# Patient Record
Sex: MALE | Race: WHITE | Employment: FULL TIME | URBAN - METROPOLITAN AREA
[De-identification: names, ages, dates, MRNs, and addresses within clinical notes are randomized per-mention and may not be internally consistent; named-entity substitution may affect disease eponyms.]

---

## 2022-12-20 ENCOUNTER — HOSPITAL ENCOUNTER (INPATIENT)
Age: 57
LOS: 4 days | Discharge: HOME | DRG: 871 | End: 2022-12-24
Attending: EMERGENCY MEDICINE | Admitting: HOSPITALIST
Payer: COMMERCIAL

## 2022-12-20 ENCOUNTER — APPOINTMENT (OUTPATIENT)
Dept: CT IMAGING | Age: 57
DRG: 871 | End: 2022-12-20
Payer: COMMERCIAL

## 2022-12-20 ENCOUNTER — APPOINTMENT (OUTPATIENT)
Dept: GENERAL RADIOLOGY | Age: 57
DRG: 871 | End: 2022-12-20
Payer: COMMERCIAL

## 2022-12-20 DIAGNOSIS — E10.10 DIABETIC KETOACIDOSIS WITHOUT COMA ASSOCIATED WITH TYPE 1 DIABETES MELLITUS (HCC): ICD-10-CM

## 2022-12-20 DIAGNOSIS — E87.1 HYPONATREMIA: ICD-10-CM

## 2022-12-20 DIAGNOSIS — J18.9 MULTIFOCAL PNEUMONIA: Primary | ICD-10-CM

## 2022-12-20 DIAGNOSIS — A41.9 SEPTICEMIA (HCC): ICD-10-CM

## 2022-12-20 DIAGNOSIS — R91.1 LUNG NODULE: ICD-10-CM

## 2022-12-20 PROBLEM — Z72.89 ENGAGES IN VAPING: Status: ACTIVE | Noted: 2022-12-20

## 2022-12-20 PROBLEM — R63.4 EXCESSIVE WEIGHT LOSS: Status: ACTIVE | Noted: 2022-12-20

## 2022-12-20 PROBLEM — F10.21 ALCOHOL USE DISORDER, SEVERE, IN SUSTAINED REMISSION (HCC): Status: ACTIVE | Noted: 2022-12-20

## 2022-12-20 PROBLEM — E11.65 UNCONTROLLED TYPE 2 DIABETES MELLITUS WITH HYPERGLYCEMIA (HCC): Status: ACTIVE | Noted: 2022-12-20

## 2022-12-20 PROBLEM — K86.81 EXOCRINE PANCREATIC INSUFFICIENCY: Status: ACTIVE | Noted: 2022-12-20

## 2022-12-20 PROBLEM — Z72.0 TOBACCO ABUSE: Status: ACTIVE | Noted: 2022-12-20

## 2022-12-20 PROBLEM — E11.10 DIABETIC KETOACIDOSIS ASSOCIATED WITH TYPE 2 DIABETES MELLITUS (HCC): Status: ACTIVE | Noted: 2022-12-20

## 2022-12-20 PROBLEM — E87.20 NORMAL ANION GAP METABOLIC ACIDOSIS: Status: ACTIVE | Noted: 2022-12-20

## 2022-12-20 PROBLEM — D72.829 LEUKOCYTOSIS: Status: ACTIVE | Noted: 2022-12-20

## 2022-12-20 PROBLEM — E87.29 INCREASED ANION GAP METABOLIC ACIDOSIS: Status: ACTIVE | Noted: 2022-12-20

## 2022-12-20 PROBLEM — E11.10 DKA, TYPE 2, NOT AT GOAL (HCC): Status: ACTIVE | Noted: 2022-12-20

## 2022-12-20 LAB
A/G RATIO: 1.1 (ref 1.1–2.2)
ALBUMIN SERPL-MCNC: 3.6 G/DL (ref 3.4–5)
ALP BLD-CCNC: 108 U/L (ref 40–129)
ALT SERPL-CCNC: 14 U/L (ref 10–40)
ANION GAP SERPL CALCULATED.3IONS-SCNC: 15 MMOL/L (ref 3–16)
ANION GAP SERPL CALCULATED.3IONS-SCNC: 19 MMOL/L (ref 3–16)
AST SERPL-CCNC: 21 U/L (ref 15–37)
BASE EXCESS VENOUS: -10.1 MMOL/L (ref -3–3)
BASE EXCESS VENOUS: -9.5 MMOL/L (ref -3–3)
BASOPHILS ABSOLUTE: 0 K/UL (ref 0–0.2)
BASOPHILS RELATIVE PERCENT: 0 %
BILIRUB SERPL-MCNC: 0.4 MG/DL (ref 0–1)
BILIRUBIN URINE: ABNORMAL
BLOOD, URINE: ABNORMAL
BUN BLDV-MCNC: 4 MG/DL (ref 7–20)
BUN BLDV-MCNC: 5 MG/DL (ref 7–20)
CALCIUM SERPL-MCNC: 8.1 MG/DL (ref 8.3–10.6)
CALCIUM SERPL-MCNC: 8.8 MG/DL (ref 8.3–10.6)
CARBOXYHEMOGLOBIN: 1.4 % (ref 0–1.5)
CARBOXYHEMOGLOBIN: 1.4 % (ref 0–1.5)
CHLORIDE BLD-SCNC: 84 MMOL/L (ref 99–110)
CHLORIDE BLD-SCNC: 89 MMOL/L (ref 99–110)
CHLORIDE URINE RANDOM: <20 MMOL/L
CLARITY: CLEAR
CO2: 15 MMOL/L (ref 21–32)
CO2: 16 MMOL/L (ref 21–32)
COLOR: YELLOW
CREAT SERPL-MCNC: 0.7 MG/DL (ref 0.9–1.3)
CREAT SERPL-MCNC: 0.8 MG/DL (ref 0.9–1.3)
CREATININE URINE: 45.5 MG/DL (ref 39–259)
EKG ATRIAL RATE: 93 BPM
EKG DIAGNOSIS: NORMAL
EKG P AXIS: 42 DEGREES
EKG P-R INTERVAL: 148 MS
EKG Q-T INTERVAL: 370 MS
EKG QRS DURATION: 102 MS
EKG QTC CALCULATION (BAZETT): 460 MS
EKG R AXIS: 68 DEGREES
EKG T AXIS: 65 DEGREES
EKG VENTRICULAR RATE: 93 BPM
EOSINOPHILS ABSOLUTE: 0 K/UL (ref 0–0.6)
EOSINOPHILS RELATIVE PERCENT: 0 %
EPITHELIAL CELLS, UA: ABNORMAL /HPF (ref 0–5)
ETHANOL: NORMAL MG/DL (ref 0–0.08)
ETHANOL: NORMAL MG/DL (ref 0–0.08)
GFR SERPL CREATININE-BSD FRML MDRD: >60 ML/MIN/{1.73_M2}
GFR SERPL CREATININE-BSD FRML MDRD: >60 ML/MIN/{1.73_M2}
GLUCOSE BLD-MCNC: 161 MG/DL (ref 70–99)
GLUCOSE BLD-MCNC: 163 MG/DL (ref 70–99)
GLUCOSE BLD-MCNC: 166 MG/DL (ref 70–99)
GLUCOSE BLD-MCNC: 176 MG/DL (ref 70–99)
GLUCOSE BLD-MCNC: 205 MG/DL (ref 70–99)
GLUCOSE URINE: NEGATIVE MG/DL
HCO3 VENOUS: 15.6 MMOL/L (ref 23–29)
HCO3 VENOUS: 15.7 MMOL/L (ref 23–29)
HCT VFR BLD CALC: 33.3 % (ref 40.5–52.5)
HEMOGLOBIN: 11.7 G/DL (ref 13.5–17.5)
HYALINE CASTS: ABNORMAL /LPF (ref 0–2)
INFLUENZA A: NOT DETECTED
INFLUENZA B: NOT DETECTED
KETONES, URINE: >=80 MG/DL
LACTIC ACID, SEPSIS: 1.1 MMOL/L (ref 0.4–1.9)
LACTIC ACID, SEPSIS: 1.4 MMOL/L (ref 0.4–1.9)
LEUKOCYTE ESTERASE, URINE: NEGATIVE
LYMPHOCYTES ABSOLUTE: 2.7 K/UL (ref 1–5.1)
LYMPHOCYTES RELATIVE PERCENT: 13 %
MCH RBC QN AUTO: 32.7 PG (ref 26–34)
MCHC RBC AUTO-ENTMCNC: 35.1 G/DL (ref 31–36)
MCV RBC AUTO: 93.2 FL (ref 80–100)
METHEMOGLOBIN VENOUS: 0.4 %
METHEMOGLOBIN VENOUS: 0.7 %
MICROSCOPIC EXAMINATION: YES
MONOCYTES ABSOLUTE: 1.5 K/UL (ref 0–1.3)
MONOCYTES RELATIVE PERCENT: 7 %
MUCUS: ABNORMAL /LPF
NEUTROPHILS ABSOLUTE: 16.7 K/UL (ref 1.7–7.7)
NEUTROPHILS RELATIVE PERCENT: 80 %
NITRITE, URINE: NEGATIVE
O2 SAT, VEN: 85 %
O2 SAT, VEN: 95 %
O2 THERAPY: ABNORMAL
O2 THERAPY: ABNORMAL
OSMOLALITY URINE: 197 MOSM/KG (ref 390–1070)
OSMOLALITY: 256 MOSM/KG (ref 275–295)
OSMOLALITY: 259 MOSM/KG (ref 275–295)
PCO2, VEN: 32 MMHG (ref 40–50)
PCO2, VEN: 34.2 MMHG (ref 40–50)
PDW BLD-RTO: 12.2 % (ref 12.4–15.4)
PERFORMED ON: ABNORMAL
PH UA: 5.5 (ref 5–8)
PH VENOUS: 7.28 (ref 7.35–7.45)
PH VENOUS: 7.31 (ref 7.35–7.45)
PLATELET # BLD: 410 K/UL (ref 135–450)
PMV BLD AUTO: 6.7 FL (ref 5–10.5)
PO2, VEN: 52.9 MMHG (ref 25–40)
PO2, VEN: 79.6 MMHG (ref 25–40)
POTASSIUM REFLEX MAGNESIUM: 3.9 MMOL/L (ref 3.5–5.1)
POTASSIUM REFLEX MAGNESIUM: 3.9 MMOL/L (ref 3.5–5.1)
POTASSIUM, UR: 15.5 MMOL/L
PROCALCITONIN: 0.68 NG/ML (ref 0–0.15)
PROTEIN UA: NEGATIVE MG/DL
RBC # BLD: 3.58 M/UL (ref 4.2–5.9)
RBC UA: ABNORMAL /HPF (ref 0–4)
SALICYLATE, SERUM: 2.7 MG/DL (ref 15–30)
SARS-COV-2 RNA, RT PCR: NOT DETECTED
SODIUM BLD-SCNC: 118 MMOL/L (ref 136–145)
SODIUM BLD-SCNC: 120 MMOL/L (ref 136–145)
SODIUM URINE: <20 MMOL/L
SPECIFIC GRAVITY UA: 1.01 (ref 1–1.03)
TCO2 CALC VENOUS: 17 MMOL/L
TCO2 CALC VENOUS: 17 MMOL/L
TOTAL PROTEIN: 7 G/DL (ref 6.4–8.2)
URINE REFLEX TO CULTURE: ABNORMAL
URINE TYPE: ABNORMAL
UROBILINOGEN, URINE: 0.2 E.U./DL
WBC # BLD: 20.9 K/UL (ref 4–11)
WBC UA: ABNORMAL /HPF (ref 0–5)

## 2022-12-20 PROCEDURE — 71250 CT THORAX DX C-: CPT

## 2022-12-20 PROCEDURE — 93005 ELECTROCARDIOGRAM TRACING: CPT | Performed by: EMERGENCY MEDICINE

## 2022-12-20 PROCEDURE — 71046 X-RAY EXAM CHEST 2 VIEWS: CPT

## 2022-12-20 PROCEDURE — 80179 DRUG ASSAY SALICYLATE: CPT

## 2022-12-20 PROCEDURE — 85025 COMPLETE CBC W/AUTO DIFF WBC: CPT

## 2022-12-20 PROCEDURE — 6370000000 HC RX 637 (ALT 250 FOR IP): Performed by: INTERNAL MEDICINE

## 2022-12-20 PROCEDURE — 87449 NOS EACH ORGANISM AG IA: CPT

## 2022-12-20 PROCEDURE — 87040 BLOOD CULTURE FOR BACTERIA: CPT

## 2022-12-20 PROCEDURE — 82570 ASSAY OF URINE CREATININE: CPT

## 2022-12-20 PROCEDURE — 82077 ASSAY SPEC XCP UR&BREATH IA: CPT

## 2022-12-20 PROCEDURE — 84145 PROCALCITONIN (PCT): CPT

## 2022-12-20 PROCEDURE — 6360000002 HC RX W HCPCS: Performed by: INTERNAL MEDICINE

## 2022-12-20 PROCEDURE — C9113 INJ PANTOPRAZOLE SODIUM, VIA: HCPCS | Performed by: INTERNAL MEDICINE

## 2022-12-20 PROCEDURE — 93010 ELECTROCARDIOGRAM REPORT: CPT | Performed by: INTERNAL MEDICINE

## 2022-12-20 PROCEDURE — 84300 ASSAY OF URINE SODIUM: CPT

## 2022-12-20 PROCEDURE — 2580000003 HC RX 258: Performed by: INTERNAL MEDICINE

## 2022-12-20 PROCEDURE — 99285 EMERGENCY DEPT VISIT HI MDM: CPT

## 2022-12-20 PROCEDURE — 87636 SARSCOV2 & INF A&B AMP PRB: CPT

## 2022-12-20 PROCEDURE — 99223 1ST HOSP IP/OBS HIGH 75: CPT | Performed by: INTERNAL MEDICINE

## 2022-12-20 PROCEDURE — 2000000000 HC ICU R&B

## 2022-12-20 PROCEDURE — 83605 ASSAY OF LACTIC ACID: CPT

## 2022-12-20 PROCEDURE — 94640 AIRWAY INHALATION TREATMENT: CPT

## 2022-12-20 PROCEDURE — 2500000003 HC RX 250 WO HCPCS: Performed by: INTERNAL MEDICINE

## 2022-12-20 PROCEDURE — 82436 ASSAY OF URINE CHLORIDE: CPT

## 2022-12-20 PROCEDURE — 83930 ASSAY OF BLOOD OSMOLALITY: CPT

## 2022-12-20 PROCEDURE — 36415 COLL VENOUS BLD VENIPUNCTURE: CPT

## 2022-12-20 PROCEDURE — 82803 BLOOD GASES ANY COMBINATION: CPT

## 2022-12-20 PROCEDURE — 2580000003 HC RX 258: Performed by: EMERGENCY MEDICINE

## 2022-12-20 PROCEDURE — 84133 ASSAY OF URINE POTASSIUM: CPT

## 2022-12-20 PROCEDURE — 96375 TX/PRO/DX INJ NEW DRUG ADDON: CPT

## 2022-12-20 PROCEDURE — 80053 COMPREHEN METABOLIC PANEL: CPT

## 2022-12-20 PROCEDURE — 83935 ASSAY OF URINE OSMOLALITY: CPT

## 2022-12-20 PROCEDURE — 81001 URINALYSIS AUTO W/SCOPE: CPT

## 2022-12-20 PROCEDURE — 96365 THER/PROPH/DIAG IV INF INIT: CPT

## 2022-12-20 PROCEDURE — 6360000002 HC RX W HCPCS: Performed by: EMERGENCY MEDICINE

## 2022-12-20 RX ORDER — IPRATROPIUM BROMIDE AND ALBUTEROL SULFATE 2.5; .5 MG/3ML; MG/3ML
1 SOLUTION RESPIRATORY (INHALATION)
Status: DISCONTINUED | OUTPATIENT
Start: 2022-12-20 | End: 2022-12-20

## 2022-12-20 RX ORDER — PANTOPRAZOLE SODIUM 40 MG/10ML
40 INJECTION, POWDER, LYOPHILIZED, FOR SOLUTION INTRAVENOUS DAILY
Status: DISCONTINUED | OUTPATIENT
Start: 2022-12-20 | End: 2022-12-22

## 2022-12-20 RX ORDER — BENZONATATE 100 MG/1
200 CAPSULE ORAL 3 TIMES DAILY PRN
Status: DISCONTINUED | OUTPATIENT
Start: 2022-12-20 | End: 2022-12-20 | Stop reason: SDUPTHER

## 2022-12-20 RX ORDER — TRAZODONE HYDROCHLORIDE 100 MG/1
100 TABLET ORAL NIGHTLY
COMMUNITY
Start: 2022-10-21

## 2022-12-20 RX ORDER — LISINOPRIL 5 MG/1
5 TABLET ORAL DAILY
COMMUNITY
Start: 2022-10-21

## 2022-12-20 RX ORDER — KETOROLAC TROMETHAMINE 30 MG/ML
30 INJECTION, SOLUTION INTRAMUSCULAR; INTRAVENOUS ONCE
Status: COMPLETED | OUTPATIENT
Start: 2022-12-20 | End: 2022-12-20

## 2022-12-20 RX ORDER — 0.9 % SODIUM CHLORIDE 0.9 %
1000 INTRAVENOUS SOLUTION INTRAVENOUS ONCE
Status: COMPLETED | OUTPATIENT
Start: 2022-12-20 | End: 2022-12-20

## 2022-12-20 RX ORDER — INSULIN LISPRO 100 [IU]/ML
INJECTION, SOLUTION INTRAVENOUS; SUBCUTANEOUS
COMMUNITY
Start: 2021-09-14

## 2022-12-20 RX ORDER — INSULIN LISPRO 100 [IU]/ML
0-4 INJECTION, SOLUTION INTRAVENOUS; SUBCUTANEOUS NIGHTLY
Status: DISCONTINUED | OUTPATIENT
Start: 2022-12-20 | End: 2022-12-22

## 2022-12-20 RX ORDER — INSULIN LISPRO 100 [IU]/ML
0-8 INJECTION, SOLUTION INTRAVENOUS; SUBCUTANEOUS
Status: DISCONTINUED | OUTPATIENT
Start: 2022-12-20 | End: 2022-12-22

## 2022-12-20 RX ORDER — M-VIT,TX,IRON,MINS/CALC/FOLIC 27MG-0.4MG
1 TABLET ORAL DAILY
COMMUNITY

## 2022-12-20 RX ORDER — NICOTINE 21 MG/24HR
1 PATCH, TRANSDERMAL 24 HOURS TRANSDERMAL DAILY
Status: DISCONTINUED | OUTPATIENT
Start: 2022-12-21 | End: 2022-12-24 | Stop reason: HOSPADM

## 2022-12-20 RX ORDER — BENZONATATE 100 MG/1
100 CAPSULE ORAL 3 TIMES DAILY PRN
Status: DISCONTINUED | OUTPATIENT
Start: 2022-12-20 | End: 2022-12-24 | Stop reason: HOSPADM

## 2022-12-20 RX ORDER — INSULIN DEGLUDEC INJECTION 100 U/ML
INJECTION, SOLUTION SUBCUTANEOUS
COMMUNITY
Start: 2021-09-14

## 2022-12-20 RX ORDER — CITALOPRAM 40 MG/1
40 TABLET ORAL DAILY
COMMUNITY
Start: 2022-10-21

## 2022-12-20 RX ORDER — PANCRELIPASE 36000; 180000; 114000 [USP'U]/1; [USP'U]/1; [USP'U]/1
72000 CAPSULE, DELAYED RELEASE PELLETS ORAL
Status: ON HOLD | COMMUNITY
Start: 2021-01-13 | End: 2022-12-22 | Stop reason: CLARIF

## 2022-12-20 RX ORDER — IPRATROPIUM BROMIDE AND ALBUTEROL SULFATE 2.5; .5 MG/3ML; MG/3ML
1 SOLUTION RESPIRATORY (INHALATION) EVERY 4 HOURS PRN
Status: DISCONTINUED | OUTPATIENT
Start: 2022-12-20 | End: 2022-12-24 | Stop reason: HOSPADM

## 2022-12-20 RX ORDER — LOVASTATIN 20 MG/1
20 TABLET ORAL NIGHTLY
COMMUNITY
Start: 2022-10-21

## 2022-12-20 RX ORDER — SODIUM CHLORIDE, SODIUM GLUCONATE, SODIUM ACETATE, POTASSIUM CHLORIDE AND MAGNESIUM CHLORIDE 526; 502; 368; 37; 30 MG/100ML; MG/100ML; MG/100ML; MG/100ML; MG/100ML
INJECTION, SOLUTION INTRAVENOUS CONTINUOUS
Status: DISCONTINUED | OUTPATIENT
Start: 2022-12-20 | End: 2022-12-22

## 2022-12-20 RX ORDER — ACETAMINOPHEN 325 MG/1
650 TABLET ORAL EVERY 4 HOURS PRN
Status: DISCONTINUED | OUTPATIENT
Start: 2022-12-20 | End: 2022-12-24 | Stop reason: HOSPADM

## 2022-12-20 RX ORDER — ENOXAPARIN SODIUM 100 MG/ML
40 INJECTION SUBCUTANEOUS DAILY
Status: DISCONTINUED | OUTPATIENT
Start: 2022-12-20 | End: 2022-12-24 | Stop reason: HOSPADM

## 2022-12-20 RX ADMIN — SODIUM CHLORIDE, SODIUM GLUCONATE, SODIUM ACETATE, POTASSIUM CHLORIDE AND MAGNESIUM CHLORIDE: 526; 502; 368; 37; 30 INJECTION, SOLUTION INTRAVENOUS at 18:16

## 2022-12-20 RX ADMIN — CEFTRIAXONE SODIUM 1000 MG: 1 INJECTION, POWDER, FOR SOLUTION INTRAMUSCULAR; INTRAVENOUS at 15:24

## 2022-12-20 RX ADMIN — ACETAMINOPHEN 650 MG: 325 TABLET ORAL at 21:17

## 2022-12-20 RX ADMIN — ENOXAPARIN SODIUM 40 MG: 100 INJECTION SUBCUTANEOUS at 18:19

## 2022-12-20 RX ADMIN — SODIUM BICARBONATE 50 MEQ: 84 INJECTION, SOLUTION INTRAVENOUS at 18:19

## 2022-12-20 RX ADMIN — PANTOPRAZOLE SODIUM 40 MG: 40 INJECTION, POWDER, FOR SOLUTION INTRAVENOUS at 18:19

## 2022-12-20 RX ADMIN — AZITHROMYCIN MONOHYDRATE 500 MG: 500 INJECTION, POWDER, LYOPHILIZED, FOR SOLUTION INTRAVENOUS at 16:20

## 2022-12-20 RX ADMIN — KETOROLAC TROMETHAMINE 30 MG: 30 INJECTION, SOLUTION INTRAMUSCULAR; INTRAVENOUS at 15:21

## 2022-12-20 RX ADMIN — IPRATROPIUM BROMIDE AND ALBUTEROL SULFATE 1 AMPULE: .5; 2.5 SOLUTION RESPIRATORY (INHALATION) at 20:52

## 2022-12-20 RX ADMIN — BENZONATATE 100 MG: 100 CAPSULE ORAL at 21:17

## 2022-12-20 RX ADMIN — SODIUM CHLORIDE 1000 ML: 9 INJECTION, SOLUTION INTRAVENOUS at 14:21

## 2022-12-20 RX ADMIN — SODIUM CHLORIDE 1000 ML: 9 INJECTION, SOLUTION INTRAVENOUS at 13:28

## 2022-12-20 ASSESSMENT — PAIN DESCRIPTION - LOCATION
LOCATION: HEAD
LOCATION: HEAD

## 2022-12-20 ASSESSMENT — ENCOUNTER SYMPTOMS
SHORTNESS OF BREATH: 1
ABDOMINAL PAIN: 0
VOMITING: 0
NAUSEA: 0
COUGH: 1

## 2022-12-20 ASSESSMENT — PAIN SCALES - GENERAL
PAINLEVEL_OUTOF10: 2
PAINLEVEL_OUTOF10: 0
PAINLEVEL_OUTOF10: 6

## 2022-12-20 ASSESSMENT — PAIN DESCRIPTION - DESCRIPTORS: DESCRIPTORS: ACHING;PRESSURE

## 2022-12-20 ASSESSMENT — PAIN DESCRIPTION - ONSET: ONSET: ON-GOING

## 2022-12-20 ASSESSMENT — PAIN DESCRIPTION - FREQUENCY: FREQUENCY: CONTINUOUS

## 2022-12-20 ASSESSMENT — PAIN - FUNCTIONAL ASSESSMENT: PAIN_FUNCTIONAL_ASSESSMENT: 0-10

## 2022-12-20 ASSESSMENT — PAIN DESCRIPTION - PAIN TYPE: TYPE: ACUTE PAIN

## 2022-12-20 NOTE — H&P
HOSPITALISTS HISTORY AND PHYSICAL    12/20/2022 4:03 PM    Patient Information:  Chad Sanabria is a 61 y.o. male [id number]  PCP:  Joe Brennan (Tel: [phone] )    Chief complaint:    Chief Complaint   Patient presents with    Abnormal Lab     Pt reports that he has been ill, worse over last week with runny nose and fever and cough. Pt reports he was at his PCP office and had routine bloodwork done and found to have low sodium. History of Present Illness:  Osmar Minor is a 61 y.o. male who presented to the ED directly from his PCPs office after routine blood work revealed acute on chronic severe hyponatremia (118) and left shifted leukocytosis. Upon further questioning patient reports that he has been ill for greater than 1 weeks duration with fever, chills, runny nose and cough. He is unaware of any recent sick exposures, but is being evaluated during an influenza A outbreak. Patient has a complex medical history which includes pancreatic endocrine and exocrine insufficiency related to past EtOH abuse (currently in sustained remission x15 years). As a result, he is an insulin-dependent diabetic he reports that he was compliant with his regimen in most circumstances. Patient does continue to smoke tobacco.    Upon arrival to the ED patient was tachycardic, tachypneic, and with soft /59. Stat EKG demonstrates NSR without evidence of ischemia. CXR notable for right-sided multifocal pneumonia, as well as JACE pulmonary nodule. Influenza A/B and SARS-CoV-2 testing performed and noted to be negative. Labs obtained and notable for increased anion gap metabolic acidosis, acute hyperglycemia, large ketonuria consistent with DKA. Other lab abnormalities include: Acute on chronic hyponatremia 118, left shifted leukocytosis 20.9, and anemia with H/H 11.7/33. 3.   Patient initiated on 2 L IV fluid bolus per ED attending prior to admit request.  He also received a dosage of IV Rocephin and Zithromax following collection of blood cultures. Ultimately patient will require ICU admission to assist with DKA, severe hyponatremia, and multifocal pneumonia. History obtained from patient and review of [facility] chart     REVIEW OF SYSTEMS:   Constitutional: Positive for fever,chills, and generalized weakness  ENT: Positive for headache, rhinorrhea, and sore throat. Respiratory: Acute dyspnea with intermittent productive cough  Cardiovascular: Negative for chest pain, palpitations, peripheral edema, orthopnea or PND  Gastrointestinal: Chronic pancreatic exocrine insufficiency; frequent diarrhea; no hematemesis, hematochezia, or melena; positive anorexia  Genitourinary: Positive oliguria; negative for dysuria, frequency, retention; no incontinence  Hematologic/Lymphatic: Negative for bleeding tendency/excessive bruising  Musculoskeletal: Positive acute myalgias and arthalgias; able to ambulate without difficulty  Neurologic: Negative for LOC, seizure activity, paresthesias, dysarthria, vertigo, and gait disturbance  Skin: Negative for itching,rash, decubitus  Psychiatric: Negative for depression,anxiety, and agitation; no hallucinations; denies SI/HI  Endocrine: Insulin-dependent diabetes on Tresiba    Past Medical History:   has a past medical history of Diabetes mellitus ([facility] [address] Past Surgical History:   has no past surgical history on file. Medications:  No current facility-administered medications on file prior to encounter.      Current Outpatient Medications on File Prior to Encounter   Medication Sig Dispense Refill    citalopram (CELEXA) 40 MG tablet Take 40 mg by mouth daily      Insulin Degludec (TRESIBA FLEXTOUCH) 100 UNIT/ML SOPN Take 12 units daily      insulin lispro, 1 Unit Dial, (HUMALOG/ADMELOG) 100 UNIT/ML SOPN 4 units with breakfast, lunch and dinner plus scale for maXIMUM OF 30      lisinopril (PRINIVIL;ZESTRIL) 5 MG tablet Take 5 mg by mouth daily      lovastatin (MEVACOR) 20 MG tablet Take 20 mg by mouth nightly      metFORMIN (GLUCOPHAGE) 1000 MG tablet Take 1,000 mg by mouth 2 times daily (with meals)      lipase-protease-amylase (CREON) 49062-423927 units CPEP delayed release capsule Take 72,000 Units by mouth 3 times daily (with meals)      traZODone (DESYREL) 100 MG tablet Take 100 mg by mouth nightly      Multiple Vitamins-Minerals (THERAPEUTIC MULTIVITAMIN-MINERALS) tablet Take 1 tablet by mouth daily         Allergies:  No Known Allergies     Social History:   reports that he has been smoking cigarettes. He has never used smokeless tobacco. He reports that he does not currently use alcohol. He reports that he does not use drugs. Remote history of alcoholism in remission x15 years.     Family History:    Cancer Father   THROAT    Colon polyps Mother     Physical Exam:  /72   Pulse 96   Temp 98.4 °F (36.9 °C) (Oral)   Resp (!) 35   Ht 6' (1.829 m)   Wt 190 lb (86.2 kg)   SpO2 97%   BMI 25.77 kg/m²     General appearance: Acutely ill adult male resting in bed  Eyes: Sclera clear without conjunctival injection; PERRLA; EOMI  ENT: Mucous membranes dry without thrush; normal dentition  Neck: Supple without meningismus; no goiter; no carotid bruit bilaterally  Cardiovascular: Regular rhythm without ectopy; normal S1-S2 with no murmurs; no peripheral edema; no JVD  Respiratory: Mild tachypnea; diffuse wheeze with bibasilar rales; coughing paroxysms present  Gastrointestinal: Abdomen soft, non-tender, not distended; bowel sounds normal; no masses/organomegaly appreciated  Musculoskeletal: FROM spine and extremities x4; no gross deformity  Neurology: A&O x3; cranial nerves 2-12 grossly intact; motor 5/5  BUE/BLE; no seizure activity; finger-to-nose/heel-to-shin intact; no pronator drift  Psychiatry: Well-groomed with good eye contact; appropriate affect; no visual/auditory hallucination  Skin: Warm, dry, normal turgor, no rash  PV: 2/4 radial and dorsalis pedis bilaterally; brisk capillary refill    Labs:  CBC:   Lab Results   Component Value Date/Time    WBC 20.9 12/20/2022 01:14 PM    RBC 3.58 12/20/2022 01:14 PM    HGB 11.7 12/20/2022 01:14 PM    HCT 33.3 12/20/2022 01:14 PM    MCV 93.2 12/20/2022 01:14 PM    MCH 32.7 12/20/2022 01:14 PM    MCHC 35.1 12/20/2022 01:14 PM    RDW 12.2 12/20/2022 01:14 PM     12/20/2022 01:14 PM    MPV 6.7 12/20/2022 01:14 PM     BMP:    Lab Results   Component Value Date/Time     12/20/2022 03:17 PM    K 3.9 12/20/2022 03:17 PM    CL 89 12/20/2022 03:17 PM    CO2 16 12/20/2022 03:17 PM    BUN 4 12/20/2022 03:17 PM    CREATININE 0.7 12/20/2022 03:17 PM    CALCIUM 8.1 12/20/2022 03:17 PM    GFRAA >60 09/21/2010 09:13 PM    LABGLOM >60 12/20/2022 03:17 PM    GLUCOSE 166 12/20/2022 03:17 PM           EKG:    Ventricular Rate 93 BPM QTc Calculation (Bazett) 460 ms   Atrial Rate 93 BPM P Axis 42 degrees   P-R Interval 148 ms R Axis 68 degrees   QRS Duration 102 ms T Axis 65 degrees   Q-T Interval 370 ms Diagnosis Normal sinus rhythmNormal      I visualized CXR images and EKG strips personally and agree with documented interpretation    Discussed case  with ED provider    Problem List:  Principal Problem:    DKA, type 2, not at goal [facility])  Active Problems:    Acute hyponatremia    Multifocal pneumonia    Tobacco abuse    Diabetic ketoacidosis associated with type 2 diabetes mellitus ([facility] [address]    Exocrine pancreatic insufficiency    Alcohol use disorder, severe, in sustained remission ([facility] [address]  Resolved Problems:    * No resolved hospital problems.  *        Consults:  IP CONSULT TO NEPHROLOGY  IP CONSULT TO CRITICAL CARE      Assessment/Plan:     Uncontrolled IDDM with early DKA  -Admit to ICU for continuous telemetry and hourly POC glucose checks; glucose only 205, however patient with large ketones and anion gap  -Serial renal panels, lactate, beta hydroxybutyrate to be trended every 4 hours pending closure of anion gap; A1c pending  -Initiate insulin drip calculated per DKA protocol  -Patient initiated on aggressive NS IVF; orders placed to add dextrose once POC glucose levels less than 250  -Patient to remain NPO pending anion gap closure  -CXR and labs consistent with right-sided multifocal pneumonia, which will be treated with IV antibiotics as documented below  -Consultation placed to critical care attending for assistance with patient care while requiring ICU stay  -Patient educated on the necessity of dietary and medication compliance to avoid recurrent DKA as well as diabetic comorbidities     Acute on chronic hyponatremia  -Patient admitted to telemetry floor with fall and seizure precautions in place; serial neuro checks overnight with strict I's and O's  - 2 L normal saline bolus administered in ED prior to admission consult; serial renal panels every 4 hours scheduled  -Serum/urine osmolality, urine creatinine and spot urine sodium to determine FENa   -NEPHRO consulted urgently to assist with fluid management due to avoid overly aggressive correction (with subsequent CPM)    Right-sided multifocal pneumonia  -Patient admitted to floor for continuous telemetry and SPO2 monitoring  -Supplemental O2 scheduled for pulse ox < 90%; respiratory isolation measures in place  --IVF resuscitation initiated in ED and will be continued overnight with strict I/O's documentation  --Blood and sputum cultures ordered STAT  -Following collection of cultures, patient initiated on broad-spectrum IV antibiotics including Zosyn (aspiration concern) and Zithromax (rule out Legionella)  -Encourage incentive spirometry and aggressive pulmonary toilet as tolerated  -Serial labs including CBC, CMP, lactic acid, procalcitonin scheduled to monitor disease progression     Pancreatic exocrine insufficiency   -Patient with remote history of EtOH abuse in for remission x15 years  -He is managed with high dosage Creon TID, which will be reordered during his inpatient stay      DVT prophylaxis-Lovenox 40 mg subcu daily  Code status-full code  Diet-n.p.o. pending gap closure  IV access-PIV established in ED    Admit as inpatient. I anticipate hospitalization spanning more than two midnights for investigation and treatment of the above medically necessary diagnoses. Comment: Please note this report has been produced using speech recognition software and may contain errors related to that system including errors in grammar, punctuation, and spelling, as well as words and phrases that may be inappropriate. If there are any questions or concerns please feel free to contact the dictating provider for clarification.          Yesenia Arshad MD    12/20/2022 4:03 PM

## 2022-12-20 NOTE — CONSULTS
INPATIENT PULMONARY CRITICAL CARE CONSULT NOTE      Chief Complaint/Referring Provider:  Patient is being seen at the request of Dr. Krystal Morrell for a consultation for DKA     Presenting HPI: Patient came to the hospital because of abnormal labs    Patient is a 77-year-old male who states that he was apparently in his usual state of health about 3 weeks back when he started having paroxysmal coughing which was significant along with that patient states that he had rhinorrhea nasal congestion postnasal drainage, patient also had greenish snot coming from his nose, patient also had fluctuating temperatures, patient also states that he also felt weak and was somewhat disoriented for last few days time, and feels very forgetful, forgetting what street names are also feeling dazed patient had gone to his PCP for general checkup and also underwent blood tests and at that time patient was told that his sodium was low, patient was told to observe but patient was continued to be symptomatic for that reason patient came to the hospital, patient states that he continues to be having paroxysmal coughing along with that patient has a shortness of breath and wheezing, patient does not have any chest pain per se, no palpitations, patient states that he does not have any significant vomiting as such, patient does have diabetes mellitus which is not controlled per se, patient states that he has a significant exocrine pancreatic insufficiency, patient has changed his endocrinologist, patient also states in the last 6 to 9 months time he has lost 40 pounds, patient's compliance with diet is suboptimal as per the patient, patient did not have any hematochezia or melena, no hematemesis or mopped assist per se, patient does not have any significant dysuria no hematuria, patient does not have any increasing leg edema, patient on questioning further states that he did not have any personal or family history of anybody having flu, patient states that he has been a smoker but now he vapes, patient used to drink alcohol but has stopped drinking alcohol for some time, now, patient still feels tired and having less energy, patient does not have any confusion lethargy when seen, no other pertinent review of system of concern       Patient Active Problem List    Diagnosis Date Noted    Increased anion gap metabolic acidosis 20/37/8219    Acute hyponatremia 12/20/2022    Multifocal pneumonia 12/20/2022    Uncontrolled type 2 diabetes mellitus with hyperglycemia ([facility] 12/20/2022    Tobacco abuse 12/20/2022    Diabetic ketoacidosis associated with type 2 diabetes mellitus ([facility] 12/20/2022    DKA, type 2, not at goal [facility]) 12/20/2022    Exocrine pancreatic insufficiency 12/20/2022    Normal anion gap metabolic acidosis 77/41/0223    Excessive weight loss 12/20/2022    Leukocytosis 12/20/2022    Engages in vaping 12/20/2022    Alcohol use disorder, severe, in sustained remission ([facility] 12/20/2022       Past Medical History:   Diagnosis Date    Diabetes mellitus ([facility]         History reviewed. No pertinent surgical history. History reviewed. No pertinent family history. Social History     Tobacco Use    Smoking status: Some Days     Types: Cigarettes    Smokeless tobacco: Never   Substance Use Topics    Alcohol use: Not Currently        No Known Allergies            Physical Exam:  Blood pressure 123/86, pulse (!) 104, temperature 98.4 °F (36.9 °C), temperature source Oral, resp. rate 24, height 6' (1.829 m), weight 190 lb (86.2 kg), SpO2 96 %.'   Constitutional: Mild respiratory distress with paroxysmal coughing spells  HENT:  Oropharynx is clear and moist. No thyromegaly. Eyes:  Conjunctivae are normal. Pupils equal, round, and reactive to light. No scleral icterus. Neck: . No tracheal deviation present. No obvious thyroid mass. Cardiovascular: sinus tachycardia  normal heart sounds. No right ventricular heave. No lower extremity edema.   Pulmonary/Chest: Bilateral wheezes. Bilateral rales. Increased chest congestion chest wall is not dull to percussion. No accessory muscle usage or stridor. Some tachypnea  Abdominal: Soft. Bowel sounds present. No distension or hernia. No tenderness. Musculoskeletal: No cyanosis. No clubbing. No obvious joint deformity. Lymphadenopathy: No cervical or supraclavicular adenopathy. Skin: Skin is warm and dry. No rash or nodules on the exposed extremities. Psychiatric: Normal mood and affect. Behavior is normal.  No anxiety. Neurologic: Alert, awake and oriented. PERRL. Speech fluent        Results:  CBC:   Recent Labs     12/20/22  1314   WBC 20.9*   HGB 11.7*   HCT 33.3*   MCV 93.2        BMP:   Recent Labs     12/20/22  1314 12/20/22  1517   * 120*   K 3.9 3.9   CL 84* 89*   CO2 15* 16*   BUN 5* 4*   CREATININE 0.8* 0.7*     LIVER PROFILE:   Recent Labs     12/20/22  1314   AST 21   ALT 14   BILITOT 0.4   ALKPHOS 108       UA:  Recent Labs     12/20/22  1437   COLORU Yellow   PHUR 5.5   WBCUA 0-2   RBCUA 0-2   MUCUS Rare*   CLARITYU Clear   SPECGRAV 1.015   LEUKOCYTESUR Negative   UROBILINOGEN 0.2   BILIRUBINUR SMALL*   BLOODU TRACE-LYSED*   GLUCOSEU Negative       Imaging:  I have reviewed radiology images personally. XR CHEST (2 VW)   Final Result   Multifocal pneumonia on the right      Small pulmonary nodule in left upper lobe, not clearly calcified. Consider   noncontrast chest CT for further characterization. RECOMMENDATION:     pulmonary management         CT CHEST WO CONTRAST    (Results Pending)     XR CHEST (2 VW)    Result Date: 12/20/2022  EXAMINATION: TWO XRAY VIEWS OF THE CHEST 12/20/2022 1:21 pm COMPARISON: None. HISTORY: ORDERING SYSTEM PROVIDED HISTORY: tachy, febrile, r/o PNA TECHNOLOGIST PROVIDED HISTORY: Reason for exam:->tachy, febrile, r/o PNA Reason for Exam: tachy, febrile, r/o PNA, cough FINDINGS: Multifocal parenchymal opacities are seen throughout the right lung. .  There is underlying emphysema. There is a focal pulmonary nodule in the left upper lobe, not clearly calcified No significant pleural fluid. There is likely underlying emphysema. Multifocal pneumonia on the right Small pulmonary nodule in left upper lobe, not clearly calcified. Consider noncontrast chest CT for further characterization. Latest Reference Range & Units 12/20/22 15:17   Sodium 136 - 145 mmol/L 120 (L)   Potassium 3.5 - 5.1 mmol/L 3.9   Chloride 99 - 110 mmol/L 89 (L)   CO2 21 - 32 mmol/L 16 (L)   BUN,BUNPL 7 - 20 mg/dL 4 (L)   Creatinine 0.9 - 1.3 mg/dL 0.7 (L)   Anion Gap 3 - 16  15   Est, Glom Filt Rate >60  >60   Lactic Acid, Sepsis 0.4 - 1.9 mmol/L 1.1   Glucose, Random 70 - 99 mg/dL 166 (H)   CALCIUM, SERUM, [id number] 8.3 - 10.6 mg/dL 8.1 (L)   Ethanol Lvl mg/dL None Detected   Salicylate, Serum 40.9 - 30.0 mg/dL 2.7 (L)      Latest Reference Range & Units Most Recent   Ethanol Lvl mg/dL None Detected  12/20/22 15:17      Latest Reference Range & Units 12/20/22 14:37   Color, UA Straw/Yellow  Yellow   Clarity, UA Clear  Clear   Glucose, UA Negative mg/dL Negative   Bilirubin, Urine Negative  SMALL ! Ketones, Urine Negative mg/dL >=80 ! Specific Gravity, UA 1.005 - 1.030  1.015   Blood, Urine Negative  TRACE-LYSED !   pH, UA 5.0 - 8.0  5.5   Protein, UA Negative mg/dL Negative   Urobilinogen, Urine <2.0 E.U./dL 0.2   Nitrite, Urine Negative  Negative   Leukocyte Esterase, Urine Negative  Negative   Urine Type  NotGiven   Urine Reflex to Culture  Not Indicated   Hyaline Casts, UA 0 - 2 /LPF 3-5 ! Mucus, UA None Seen /LPF Rare !    WBC, UA 0 - 5 /HPF 0-2   RBC, UA 0 - 4 /HPF 0-2   Epithelial Cells, UA 0 - 5 /HPF 2-5   Microscopic Examination  YES      Latest Reference Range & Units 12/20/22 13:31 12/20/22 15:17   O2 Therapy  Unknown Unknown   pH, Romero 7.350 - 7.450  7.307 (L) 7.280 (L)   pCO2, Romero 40.0 - 50.0 mmHg 32.0 (L) 34.2 (L)   pO2, Romero 25.0 - 40.0 mmHg 79.6 (H) 52.9 (H)   HCO3, Venous 23.0 - 29.0 mmol/L 15.6 (L) 15.7 (L)   TC02 (Calc), Romero Not Established mmol/L 17 17   Base Excess, Romero -3.0 - 3.0 mmol/L -9.5 (L) -10.1 (L)   MetHgb, Romero <1.5 % 0.4 0.7   O2 Sat, Romero Not Established % 95 85      Latest Reference Range & Units 12/20/22 13:14 12/20/22 15:17   Lactic Acid, Sepsis 0.4 - 1.9 mmol/L 1.4 1.1     CT abdomen/pelvis from care everywhere-  1. Calcified gallstones. No evidence of acute cholecystitis. No intra or extrahepatic duct dilatation. 2. Severe pancreatic atrophy involving the pancreatic body and tail. There is a punctate 2.8 mm calcification associated with the pancreatic head. Difficult to assess whether this is intraductal or within the parenchyma. No evidence of acute   pancreatitis. No pancreatic duct dilatation. 3. 1.2 cm left adrenal nodule. Statistically this is likely an adenoma. EUS in 2021-Procedure Performed: EGD, gastric biopsies, small bowel biopsy;   endoscopic ultrasound-linear     Procedure Details: Videoendoscope was advanced over the tongue   and the upper end of the esophagus was intubated. The vocal   cords were normal.  Z line was at 41-42 cm. Examination of the   esophagus was normal without endoscopic evidence of erosive   esophagitis or Vang's esophagus. Stomach examination in the   straight and retroflexed views was remarkable for moderate   erosive antral gastritis-multiple biopsies being obtained from   antrum for histology. Biopsies were also obtained from the body   of the stomach for histology to rule out atrophic gastritis. Pyloric channel, duodenal bulb and second part of the duodenum   were normal.  Scope was advanced into the normal-appearing third   part of the duodenum and biopsies obtained there to rule out   celiac sprue. Scope was then removed. Patient tolerated   procedure well. Anywhere echoendoscope was advanced over the tongue and the   esophagus is intubated.    AP and subcarinal spaces-normal   Celiac axis-normal with no associated lymphadenopathy   Body and tail of the pancreas-severe atrophy. Enteric duct and   body and tail could not be identified. Changes in the body and tail consistent with chronic   pancreatitis were seen. Left adrenal gland appeared prominent measuring approximately 9   mm and a small adenoma cannot be ruled out. Head of the pancreas-normal pancreatic duct. Chronic   pancreatitis changes. No calcification. Uncinate process-chronic pancreatitis changes   CBD-nondilated measuring 3 mm proximally. Gallbladder-no wall thickening. Full of gallstones. Scope was removed. Patient tolerated procedure well. HEMOGLOBIN A1C 4.2 - 5.6 % 9.4 High             Echocardiogram:None in Epic  PFT:None in Epic    BMP in Oct 2022 from care everywhere-  SODIUM 135 - 145 mEq/L 130 Low      POTASSIUM 3.6 - 5.1 mEq/L 4.5     CHLORIDE 98 - 111 mEq/L 96 Low      CO2 21 - 31 mmol/L 30     GLUCOSE, RANDOM 70 - 99 mg/dL 227 High      BLD UREA NITROGEN 8 - 26 mg/dL 15     CREATININE 0.70 - 1.30 mg/dL 0.74     CALCIUM 8.5 - 10.4 mg/dL 9.6       BMP from 12/13/22-  SODIUM 135 - 145 mEq/L 124 Low Panic   *CRITICAL VALUE*   POTASSIUM 3.6 - 5.1 mEq/L 4.6     CHLORIDE 98 - 111 mEq/L 90 Low      CO2 21 - 31 mmol/L 25     GLUCOSE, RANDOM 70 - 99 mg/dL 237 High      BLD UREA NITROGEN 8 - 26 mg/dL 7 Low      CREATININE 0.70 - 1.30 mg/dL 0.72     CALCIUM 8.5 - 10.4 mg/dL 9.2     TOTAL PROTEIN 6.0 - 8.0 g/dL 6.4     ALBUMIN 3.5 - 5.7 g/dL 4.3       Assessment:  Active Problems:    Acute hyponatremia    Multifocal pneumonia    Uncontrolled type 2 diabetes mellitus with hyperglycemia (HCC)    Tobacco abuse    Exocrine pancreatic insufficiency    Normal anion gap metabolic acidosis    Excessive weight loss    Leukocytosis    Engages in vaping    Alcohol use disorder, severe, in sustained remission (HCC)  Resolved Problems:    * No resolved hospital problems.  *          Plan:   Oxygen supplementation, if required, to keep saturation 90 and 94% only  Pulmonary toilet  Patient was shown the x-ray chest along with his findings, differential diagnosis and options  Patient does have multifocal pneumonia along with that patient has some fullness on the right hilum along with that patient have hyponatremia and excessive weight loss with history of smoking-we will get a CT scan of the chest to make sure that patient does not have any mass lesion contributing to the patient's overall clinical picture including hyponatremia  IV Rocephin and Zithromax to be continued  Urine antigen for Legionella has been ordered  Patient's LFT is normal  Patient has been on Celexa at home  Patient's anion gap is closed and patient has hyperglycemia secondary to uncontrolled diabetes mellitus  Patient continues to have normal anion gap metabolic acidosis  Will give 1 dose of sodium bicarbonate  IV fluids in the form of Plasma-Lyte ordered  Patient may not require any insulin infusion at this time on the base of the clinical data available and patient's clinical evaluations  Patient does have bronchospasm at this time and hopefully that will improve with bronchodilators-we will hold off on systemic steroids for now as patient has uncontrolled diabetes mellitus  Patient's BMP to be followed  Patient's work-up from care everywhere was reviewed including labs from October and December of this year and patient did have hyponatremia at that time also and it is gradually worsening  Monitor input output and BMP  Correct electrolytes and whenever necessary basis  Smoking cessation advised  Nicotine replacement therapy if the patient wants  Blood glucose monitoring with sliding scale insulin  May need long-acting insulin  IM to decide upon continuation of Creon and statins  PUD and DVT prophylaxis      Case discussed with patient, ER nursing and ICU team        Electronically signed by:  Casandra Arenas MD    12/20/2022    4:42 PM.

## 2022-12-20 NOTE — ED PROVIDER NOTES
Emergency Department Provider Note  Location: [facility] C2 CARD TELEMETRY  12/20/2022     Patient Identification  Cipriano lAlen is a 61 y.o. male    Chief Complaint  Abnormal Lab (Pt reports that he has been ill, worse over last week with runny nose and fever and cough. Pt reports he was at his PCP office and had routine bloodwork done and found to have low sodium.)    HPI    (History provided by patient)    Patient is a 61 y.o. 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 Patient says he sometimes feels like this in the past, but usually related to something else. He denies any nausea, vomiting, diarrhea. Has not been tested for COVID-19 or influenza in the past 2 weeks. Has not been on any antibiotics. Is taking Tylenol and ibuprofen as needed. Denies any chest pain or shortness of breath, but says when he gets coughing, sometimes it takes him a minute to catch his breath. Denies any other concerns including recent falls. No headaches or vision changes. No syncope or seizures. Says he has always had some slightly low sodium, in the 120s in the past, but his doctor wanted him evaluated, prompting him to come to the ER as well    I have reviewed the following nursing documentation:  Allergies: No Known Allergies    Past medical history:  has a past medical history of Diabetes mellitus ([facility] [address] Past surgical history:  has no past surgical history on file. Home medications:   Prior to Admission medications    Medication Sig Start Date End Date Taking?  Authorizing Provider   citalopram (CELEXA) 40 MG tablet Take 40 mg by mouth daily 10/21/22  Yes Historical Provider, MD   Insulin Degludec (TRESIBA FLEXTOUCH) 100 UNIT/ML SOPN Take 12 units daily 9/14/21  Yes Historical Provider, MD   insulin lispro, 1 Unit Dial, (HUMALOG/ADMELOG) 100 UNIT/ML SOPN 4 units with breakfast, lunch and dinner plus scale for maXIMUM OF 30 9/14/21  Yes Historical Provider, MD   lisinopril (PRINIVIL;ZESTRIL) 5 MG tablet Take 5 mg by mouth daily 10/21/22  Yes Historical Provider, MD   lovastatin (MEVACOR) 20 MG tablet Take 20 mg by mouth nightly 10/21/22  Yes Historical Provider, MD   metFORMIN (GLUCOPHAGE) 1000 MG tablet Take 1,000 mg by mouth 2 times daily (with meals) 10/21/22  Yes Historical Provider, MD   lipase-protease-amylase (CREON) 79577-358765 units CPEP delayed release capsule Take 72,000 Units by mouth 3 times daily (with meals) 1/13/21  Yes Historical Provider, MD   traZODone (DESYREL) 100 MG tablet Take 100 mg by mouth nightly 10/21/22  Yes Historical Provider, MD   Multiple Vitamins-Minerals (THERAPEUTIC MULTIVITAMIN-MINERALS) tablet Take 1 tablet by mouth daily    Historical Provider, MD       Social history:  reports that he has been smoking cigarettes. He has never used smokeless tobacco. He reports that he does not currently use alcohol. He reports that he does not use drugs. Family history:  History reviewed. No pertinent family history. ROS  Review of Systems   Constitutional:  Positive for appetite change, fatigue and fever. Negative for activity change. HENT:  Negative for congestion and postnasal drip. Respiratory:  Positive for cough and shortness of breath (with coughing). Cardiovascular:  Negative for chest pain. Gastrointestinal:  Negative for abdominal pain, nausea and vomiting. Genitourinary:  Negative for dysuria and flank pain. Skin:  Negative for rash and wound. Neurological:  Negative for dizziness and light-headedness.        Exam  Vitals: 12/20/22 1900 12/20/22 2000 12/20/22 2035 12/20/22 2100   BP:   (!) 124/55 137/61   Pulse: (!) 106 (!) 109 (!) 112 (!) 107   Resp: (!) 35 25 (!) 31 29   Temp:       TempSrc:       SpO2: 93% 98% 94% 98%   Weight:       Height:             Physical Exam  Constitutional:       Appearance: Normal appearance. He is normal weight. He is ill-appearing (coughing through exam). He is not diaphoretic. HENT:      Head: Normocephalic and atraumatic. Right Ear: External ear normal.      Left Ear: External ear normal.      Nose: Nose normal.      Mouth/Throat:      Mouth: Mucous membranes are moist.      Pharynx: Oropharynx is clear. Eyes:      General:         Right eye: No discharge. Left eye: No discharge. Conjunctiva/sclera: Conjunctivae normal.   Cardiovascular:      Rate and Rhythm: Normal rate and regular rhythm. Pulmonary:      Effort: Pulmonary effort is normal. No respiratory distress. Breath sounds: Normal breath sounds. Abdominal:      General: Abdomen is flat. Palpations: Abdomen is soft. Musculoskeletal:         General: No swelling or tenderness. Cervical back: Normal range of motion and neck supple. Skin:     General: Skin is warm and dry. Findings: No rash. Neurological:      General: No focal deficit present. Mental Status: He is alert and oriented to person, place, and time. Psychiatric:         Mood and Affect: Mood normal.         Thought Content:  Thought content normal.     ED Course    ED Medication Orders (From admission, onward)      Start Ordered     Status Ordering Provider    12/21/22 1600 12/20/22 1636  azithromycin (ZITHROMAX) 500 mg in D5W 250ml addavial  EVERY 24 HOURS        Question Answer Comment   Antimicrobial Indications Pneumonia (CAP)    CAP duration of therapy 5 days        Acknowledged JORDAN BRENNAN    12/21/22 1500 12/20/22 1636  cefTRIAXone (ROCEPHIN) 1,000 mg in dextrose 5 % 50 mL IVPB mini-bag  EVERY 24 HOURS        Question Answer Comment   Antimicrobial Indications Pneumonia (CAP)    CAP duration of therapy 7 days        Acknowledged JORDAN BRENNAN    12/21/22 0900 12/20/22 2126  nicotine (NICODERM CQ) 21 MG/24HR 1 patch  DAILY         Acknowledged Ernesto BIGGS    12/20/22 2115 12/20/22 2101  ipratropium-albuterol (DUONEB) nebulizer solution 1 ampule  EVERY 4 HOURS PRN        Question:  Initiate RT Bronchodilator Protocol  Answer:  Yes - Inpatient Protocol    Acknowledged SHELBY QUESADA    12/20/22 2106 12/20/22 2106  acetaminophen (TYLENOL) tablet 650 mg  EVERY 4 HOURS PRN         Last MAR action: Given - by Christin Rowland on 12/20/22 at 2117 Ernesto BIGGS    12/20/22 2100 12/20/22 1636  insulin lispro (HUMALOG) injection vial 0-4 Units  NIGHTLY         Last MAR action: Not Given - by Christin Rowland on 12/20/22 at [address] [facility] [address]    12/20/22 2045 12/20/22 2045  benzonatate (TESSALON) capsule 100 mg  3 TIMES DAILY PRN         Last MAR action: Given - by Christin Rowland on 12/20/22 at 2[address] [facility]    12/20/22 1700 12/20/22 1636  electrolyte-A (PLASMALYTE-A) solution  CONTINUOUS         Last MAR action: New Bag - by Edith Joshi on 12/20/22 at [address] [facility]    12/20/22 1700 12/20/22 1636  insulin lispro (HUMALOG) injection vial 0-8 Units  3 TIMES DAILY WITH MEALS         Last MAR action: Not Given - by Edith Joshi on 12/20/22 at [address], [address] [facility]    12/20/22 1700 12/20/22 1640  enoxaparin (LOVENOX) injection 40 mg  DAILY        Question:  Indication of Use  Answer:  Prophylaxis-DVT/PE    Last MAR action: Given - by Edith Joshi on 12/20/22 at [address] [facility]    12/20/22 1700 12/20/22 1640  pantoprazole (PROTONIX) injection 40 mg  DAILY         Last MAR action: Given - by Edith Joshi on 12/20/22 at [address], [address] [facility]    12/20/22 1645 12/20/22 1636  sodium bicarbonate 8.4 % injection 50 mEq  ONCE         Last MAR action: Given - by Edith Joshi on 12/20/22 at [address], [address] [facility]    12/20/22 1449 12/20/22 1442  ketorolac (TORADOL) injection 30 mg  ONCE         Last MAR action: Given - by Marleny Rios on 12/20/22 at [address] [facility]MOND    12/20/22 1430 12/20/22 1417  0.9 % sodium chloride bolus  ONCE         Last MAR action: Stopped - by Marleny Rios on 12/20/22 at 1510 PATRIC[facility] RAYMOND    12/20/22 1430 12/20/22 1424  cefTRIAXone (ROCEPHIN) 1,000 mg in dextrose 5 % 50 mL IVPB mini-bag  ONCE        Question:  Antimicrobial Indications  Answer:  Pneumonia (CAP)    Last MAR action: Stopped - by Marleny Rios on 12/20/22 at 1554 PATRICIO RAYMOND    12/20/22 1430 12/20/22 1424  azithromycin (ZITHROMAX) 500 mg in D5W 250ml addavial  ONCE        Question:  Antimicrobial Indications  Answer:  Pneumonia (CAP)    Last MAR action: Stopped - by Ethlyn Tyler on 12/20/22 at 1720 [facility]AYMON[facility]    12/20/22 1315 12/20/22 1313  0.9 % sodium chloride bolus  ONCE         Last MAR action: Stopped - by Marleny Rios on 12/20/22 at 1409 PATRICIO RAYMOND            EKG  EKG obtained today in the emergency department shows a normal sinus rhythm, ventricular rate 93. No old EKG. QTc 460. No ST segment lobation, ST segment pression, hyperacute T waves. NE interval 148. Normal axis. Otherwise reassuring EKG. Radiology  XR CHEST (2 VW)    Result Date: 12/20/2022  EXAMINATION: TWO XRAY VIEWS OF THE CHEST 12/20/2022 1:21 pm COMPARISON: None. HISTORY: ORDERING SYSTEM PROVIDED HISTORY: tachy, febrile, r/o PNA TECHNOLOGIST PROVIDED HISTORY: Reason for exam:->tachy, febrile, r/o PNA Reason for Exam: tachy, febrile, r/o PNA, cough FINDINGS: Multifocal parenchymal opacities are seen throughout the right lung. .  There is underlying emphysema. There is a focal pulmonary nodule in the left upper lobe, not clearly calcified No significant pleural fluid. There is likely underlying emphysema. Multifocal pneumonia on the right Small pulmonary nodule in left upper lobe, not clearly calcified.   Consider noncontrast chest CT for further characterization. RECOMMENDATION: *pulmonary management*     CT CHEST WO CONTRAST    Result Date: 12/20/2022  EXAMINATION: CT OF THE CHEST WITHOUT CONTRAST 12/20/2022 4:21 pm TECHNIQUE: CT of the chest was performed without the administration of intravenous contrast. Multiplanar reformatted images are provided for review. Automated exposure control, iterative reconstruction, and/or weight based adjustment of the mA/kV was utilized to reduce the radiation dose to as low as reasonably achievable. COMPARISON: None. HISTORY: ORDERING SYSTEM PROVIDED HISTORY: Multifocal pn/hyponatremia/weight loss of 40 lbs/smoker TECHNOLOGIST PROVIDED HISTORY: Reason for exam:->Multifocal pn/hyponatremia/weight loss of 40 lbs/smoker Reason for Exam: pneumonia, cough x several weeks Relevant Medical/Surgical History: smoker x 40 yrs FINDINGS: Mediastinum: Thyroid gland is unremarkable. Atherosclerotic change seen in aorta. Trace pericardial fluid is seen. Small mediastinal and hilar nodes are noted. Index precarinal node measures 1.5 cm x 2.1 cm. Lungs/pleura: Respiratory motion artifact limits evaluation of fine pulmonary parenchymal change There is a focal nodule seen in the left upper lobe, which measures 9 mm. This contains areas of increased density internally, likely early calcification. Patchy nodularity is seen in the lingula. Scattered patchy nodularity is seen in the left lower lobe. .  No significant pleural fluid on the left. On the right, patchy and confluent parenchymal opacity is seen in the right upper lobe. There is intervening septal thickening seen. Celestia Bret Patchy ground-glass opacity is seen in the right middle lobe. Patchy ground-glass opacity is seen in the right lower lobe. No significant pleural fluid on the right. Upper Abdomen: Right adrenal gland is normal.  Left adrenal gland is normal. Soft Tissues/Bones: Spurring is seen in the spine.      Multifocal consolidative change seen throughout the lungs, greatest in the right upper lobe, favored to be secondary to pneumonia. There is scattered intervening septal thickening in the areas of consolidation on the right.  Recommend follow-up to radiographic resolution to exclude underlying neoplastic etiology in the areas of consolidation Small mediastinal nodes are noted, likely reactive in the absence of a known primary RECOMMENDATIONS:     Labs  Results for orders placed or performed during the hospital encounter of 12/20/22   COVID-19 & Influenza Combo    Specimen: Nasopharyngeal Swab   Result Value Ref Range    SARS-CoV-2 RNA, RT PCR NOT DETECTED NOT DETECTED    INFLUENZA A NOT DETECTED NOT DETECTED    INFLUENZA B NOT DETECTED NOT DETECTED   CBC with Auto Differential   Result Value Ref Range    WBC 20.9 (H) 4.0 - 11.0 K/uL    RBC 3.58 (L) 4.20 - 5.90 M/uL    Hemoglobin 11.7 (L) 13.5 - 17.5 g/dL    Hematocrit 33.3 (L) 40.5 - 52.5 %    MCV 93.2 80.0 - 100.0 fL    MCH 32.7 26.0 - 34.0 pg    MCHC 35.1 31.0 - 36.0 g/dL    RDW 12.2 (L) 12.4 - 15.4 %    Platelets 576 908 - 484 K/uL    MPV 6.7 5.0 - 10.5 fL    Neutrophils % 80.0 %    Lymphocytes % 13.0 %    Monocytes % 7.0 %    Eosinophils % 0.0 %    Basophils % 0.0 %    Neutrophils Absolute 16.7 (H) 1.7 - 7.7 K/uL    Lymphocytes Absolute 2.7 1.0 - 5.1 K/uL    Monocytes Absolute 1.5 (H) 0.0 - 1.3 K/uL    Eosinophils Absolute 0.0 0.0 - 0.6 K/uL    Basophils Absolute 0.0 0.0 - 0.2 K/uL   CMP w/ Reflex to MG   Result Value Ref Range    Sodium 118 (LL) 136 - 145 mmol/L    Potassium reflex Magnesium 3.9 3.5 - 5.1 mmol/L    Chloride 84 (L) 99 - 110 mmol/L    CO2 15 (L) 21 - 32 mmol/L    Anion Gap 19 (H) 3 - 16    Glucose 205 (H) 70 - 99 mg/dL    BUN 5 (L) 7 - 20 mg/dL    Creatinine 0.8 (L) 0.9 - 1.3 mg/dL    Est, Glom Filt Rate >60 >60    Calcium 8.8 8.3 - 10.6 mg/dL    Total Protein 7.0 6.4 - 8.2 g/dL    Albumin 3.6 3.4 - 5.0 g/dL    Albumin/Globulin Ratio 1.1 1.1 - 2.2    Total Bilirubin 0.4 0.0 - 1.0 mg/dL    Alkaline Phosphatase 108 40 - 129 U/L    ALT 14 10 - 40 U/L    AST 21 15 - 37 U/L   Urinalysis with Reflex to Culture    Specimen: Urine   Result Value Ref Range    Color, UA Yellow Straw/Yellow    Clarity, UA Clear Clear    Glucose, Ur Negative Negative mg/dL    Bilirubin Urine SMALL (A) Negative    Ketones, Urine >=80 (A) Negative mg/dL    Specific Gravity, UA 1.015 1.005 - 1.030    Blood, Urine TRACE-LYSED (A) Negative    pH, UA 5.5 5.0 - 8.0    Protein, UA Negative Negative mg/dL    Urobilinogen, Urine 0.2 <2.0 E.U./dL    Nitrite, Urine Negative Negative    Leukocyte Esterase, Urine Negative Negative    Microscopic Examination YES     Urine Type NotGiven     Urine Reflex to Culture Not Indicated    Osmolality, Urine   Result Value Ref Range    Osmolality, Ur 197 (L) 390 - 1070 mOsm/kg   Osmolality   Result Value Ref Range    Osmolality 256 (L) 275 - 295 mOsm/kg   ETOH   Result Value Ref Range    Ethanol Lvl None Detected mg/dL   Blood gas, venous   Result Value Ref Range    pH, Romero 7.307 (L) 7.350 - 7.450    pCO2, Romero 32.0 (L) 40.0 - 50.0 mmHg    pO2, Romero 79.6 (H) 25.0 - 40.0 mmHg    HCO3, Venous 15.6 (L) 23.0 - 29.0 mmol/L    Base Excess, Romero -9.5 (L) -3.0 - 3.0 mmol/L    O2 Sat, Romero 95 Not Established %    Carboxyhemoglobin 1.4 0.0 - 1.5 %    MetHgb, Romero 0.4 <1.5 %    TC02 (Calc), Romero 17 Not Established mmol/L    O2 Therapy Unknown    Lactate, Sepsis   Result Value Ref Range    Lactic Acid, Sepsis 1.4 0.4 - 1.9 mmol/L   Lactate, Sepsis   Result Value Ref Range    Lactic Acid, Sepsis 1.1 0.4 - 1.9 mmol/L   Basic Metabolic Panel w/ Reflex to MG   Result Value Ref Range    Sodium 120 (L) 136 - 145 mmol/L    Potassium reflex Magnesium 3.9 3.5 - 5.1 mmol/L    Chloride 89 (L) 99 - 110 mmol/L    CO2 16 (L) 21 - 32 mmol/L    Anion Gap 15 3 - 16    Glucose 166 (H) 70 - 99 mg/dL    BUN 4 (L) 7 - 20 mg/dL    Creatinine 0.7 (L) 0.9 - 1.3 mg/dL    Est, Glom Filt Rate >60 >60    Calcium 8.1 (L) 8.3 - 10.6 mg/dL   Blood gas, venous   Result Value Ref Range    pH, Romero 7.280 (L) 7.350 - 7.450    pCO2, Romero 34.2 (L) 40.0 - 50.0 mmHg    pO2, Romero 52.9 (H) 25.0 - 40.0 mmHg    HCO3, Venous 15.7 (L) 23.0 - 29.0 mmol/L    Base Excess, Romero -10.1 (L) -3.0 - 3.0 mmol/L    O2 Sat, Romero 85 Not Established %    Carboxyhemoglobin 1.4 0.0 - 1.5 %    MetHgb, Romero 0.7 <1.5 %    TC02 (Calc), Romero 17 Not Established mmol/L    O2 Therapy Unknown    Microscopic Urinalysis   Result Value Ref Range    Hyaline Casts, UA 3-5 (A) 0 - 2 /LPF    Mucus, UA Rare (A) None Seen /LPF    WBC, UA 0-2 0 - 5 /HPF    RBC, UA 0-2 0 - 4 /HPF    Epithelial Cells, UA 2-5 0 - 5 /HPF   Electrolytes urine random   Result Value Ref Range    Sodium, Ur <20 Not Established mmol/L    Potassium, Ur 15.5 Not Established mmol/L    Chloride <20 Not Established mmol/L   Creatinine, Random Urine   Result Value Ref Range    Creatinine, Ur 45.5 39.0 - 259.0 mg/dL   Ethanol   Result Value Ref Range    Ethanol Lvl None Detected mg/dL   Procalcitonin   Result Value Ref Range    Procalcitonin 0.68 (H) 0.00 - 3.58 ng/mL   Salicylate   Result Value Ref Range    Salicylate, Serum 2.7 (L) 15.0 - 30.0 mg/dL   Osmolality   Result Value Ref Range    Osmolality 259 (L) 275 - 295 mOsm/kg   POCT Glucose   Result Value Ref Range    POC Glucose 176 (H) 70 - 99 mg/dl    Performed on ACCU-CHEK    POCT Glucose   Result Value Ref Range    POC Glucose 163 (H) 70 - 99 mg/dl    Performed on ACCU-CHEK    POCT Glucose   Result Value Ref Range    POC Glucose 161 (H) 70 - 99 mg/dl    Performed on ACCU-CHEK    EKG 12 Lead   Result Value Ref Range    Ventricular Rate 93 BPM    Atrial Rate 93 BPM    P-R Interval 148 ms    QRS Duration 102 ms    Q-T Interval 370 ms    QTc Calculation (Bazett) 460 ms    P Axis 42 degrees    R Axis 68 degrees    T Axis 65 degrees    Diagnosis       Normal sinus rhythmNormal ECGConfirmed by Gauri Loomis, [address] ([id number]) on 12/20/2022 5:21:57 PM       Procedures  Procedures      MDM  Patient seen and evaluated.   Relevant records reviewed. - Patient is a 61 y.o. male with concerns of hyponatremia, but also has been having fevers, cough, congestion, and.  Some significant fatigue. Vital signs reveal tachycardia here on arrival, no nausea, vomiting, diarrhea. No abdominal pain. Otherwise is interactive and alert. 10:31 PM  Has a leukocytosis of 20.9, is tachycardic, CXR with multifocal pneumonia on the right. We will treat with broad-spectrum antibiotics for sepsis, however not severe sepsis or septic shock. Further, has a slightly elevated anion gap of 19, with a glucose of 200, slightly acidotic at 7.3, while I will not initiate insulin protocol, I have repeat BMP and gas pending after 1.5 L IV fluids slowly over the next 2 hours the setting of hyponatremia to 118. Corrected sodium for glucose is 120. For NS; 775 mL/hr Fluid rate to increase Na by 0.5 mmol/L/hr with NS-- will give 1.5L over three hours. Repeat labs pending at the time of discharge. Urine studies pending; urine sodium, awesome's, and also Legionella in the setting of multifocal pneumonia. Ceftriaxone and azithromycin initialed after blood cultures. As this patient requires further evaluation and management as above, this patient will be admitted to the hospital for subsequent care. I spoke with Dr. Shalonda Colorado who accepts patient for admission. They are available to place admission orders. Further discussion with hospitalist, repeat labs pending, They would like to initiate DKA protocol, and they will place orders for this. Patient is updated, stable to go to the ICU.       Medications   cefTRIAXone (ROCEPHIN) 1,000 mg in dextrose 5 % 50 mL IVPB mini-bag (has no administration in time range)   azithromycin (ZITHROMAX) 500 mg in D5W 250ml addavial (has no administration in time range)   electrolyte-A (PLASMALYTE-A) solution ( IntraVENous New Bag 12/20/22 1816)   insulin lispro (HUMALOG) injection vial 0-8 Units (0 Units SubCUTAneous Not Given 12/20/22 1829)   insulin lispro (HUMALOG) injection vial 0-4 Units (0 Units SubCUTAneous Not Given 12/20/22 2122)   enoxaparin (LOVENOX) injection 40 mg (40 mg SubCUTAneous Given 12/20/22 1819)   pantoprazole (PROTONIX) injection 40 mg (40 mg IntraVENous Given 12/20/22 1819)   benzonatate (TESSALON) capsule 100 mg (100 mg Oral Given 12/20/22 2117)   ipratropium-albuterol (DUONEB) nebulizer solution 1 ampule (has no administration in time range)   acetaminophen (TYLENOL) tablet 650 mg (650 mg Oral Given 12/20/22 2117)   nicotine (NICODERM CQ) 21 MG/24HR 1 patch (has no administration in time range)   0.9 % sodium chloride bolus (0 mLs IntraVENous Stopped 12/20/22 1409)   0.9 % sodium chloride bolus (0 mLs IntraVENous Stopped 12/20/22 1510)   cefTRIAXone (ROCEPHIN) 1,000 mg in dextrose 5 % 50 mL IVPB mini-bag (0 mg IntraVENous Stopped 12/20/22 1554)   azithromycin (ZITHROMAX) 500 mg in D5W 250ml addavial (0 mg IntraVENous Stopped 12/20/22 1720)   ketorolac (TORADOL) injection 30 mg (30 mg IntraVENous Given 12/20/22 1521)   sodium bicarbonate 8.4 % injection 50 mEq (50 mEq IntraVENous Given 12/20/22 1819)     - I discussed the results, including any incidental findings, with patient. Questions answered. Patient/family agreeable to plan and express understanding of plan. Disposition:  Admit to CCU/ICU in fair condition. Is this patient to be included in the SEP-1 Core Measure due to severe sepsis or septic shock? No   Exclusion criteria - the patient is NOT to be included for SEP-1 Core Measure due to:  May have criteria for sepsis, but does not meet criteria for severe sepsis or septic shock    Consult this encounter: hospitalist    Clinical Impression:  1. Multifocal pneumonia    2. Hyponatremia    3. Septicemia ([address]    4. Diabetic ketoacidosis without coma associated with type 1 diabetes mellitus (HCC)        Blood pressure 137/61, pulse (!) 107, temperature 98.1 °F (36.7 °C), temperature source Axillary, resp.  rate 29, height 6' (1.829 m), weight 190 lb (86.2 kg), SpO2 98 %. Korina Raymond DO, am the primary attending of record and contributed the majority of evaluation and treatment of emergent care for this encounter. Total critical care time is 39 minutes, which excludes separately billable procedures and updating family. Time spent is specifically for management of the presenting complaint and symptoms initially, direct bedside care, reevaluation, review of records, and consultation. There was a high probability of clinically significant life-threatening deterioration in the patient's condition, which required my urgent intervention. This chart was generated in part by using Dragon Dictation system and may contain errors related to that system including errors in grammar, punctuation, and spelling, as well as words and phrases that may be inappropriate. If there are any questions or concerns please feel free to contact the dictating provider for clarification.      PATRICIO RAYMOND, [address]  12/20/22 [id number]

## 2022-12-21 ENCOUNTER — APPOINTMENT (OUTPATIENT)
Dept: GENERAL RADIOLOGY | Age: 57
DRG: 871 | End: 2022-12-21
Payer: COMMERCIAL

## 2022-12-21 ENCOUNTER — ANESTHESIA EVENT (OUTPATIENT)
Dept: ENDOSCOPY | Age: 57
End: 2022-12-21
Payer: COMMERCIAL

## 2022-12-21 ENCOUNTER — ANESTHESIA (OUTPATIENT)
Dept: ENDOSCOPY | Age: 57
End: 2022-12-21
Payer: COMMERCIAL

## 2022-12-21 PROBLEM — E83.39 HYPOPHOSPHATEMIA: Status: ACTIVE | Noted: 2022-12-21

## 2022-12-21 PROBLEM — R59.0 MEDIASTINAL ADENOPATHY: Status: ACTIVE | Noted: 2022-12-21

## 2022-12-21 LAB
ANION GAP SERPL CALCULATED.3IONS-SCNC: 14 MMOL/L (ref 3–16)
APPEARANCE BAL (LAVAGE): CLEAR
BUN BLDV-MCNC: <2 MG/DL (ref 7–20)
CALCIUM SERPL-MCNC: 8.5 MG/DL (ref 8.3–10.6)
CHLORIDE BLD-SCNC: 93 MMOL/L (ref 99–110)
CLOT EVALUATION BAL: ABNORMAL
CO2: 21 MMOL/L (ref 21–32)
COLOR LAVAGE: COLORLESS
CREAT SERPL-MCNC: <0.5 MG/DL (ref 0.9–1.3)
GFR SERPL CREATININE-BSD FRML MDRD: >60 ML/MIN/{1.73_M2}
GLUCOSE BLD-MCNC: 164 MG/DL (ref 70–99)
GLUCOSE BLD-MCNC: 168 MG/DL (ref 70–99)
GLUCOSE BLD-MCNC: 175 MG/DL (ref 70–99)
GLUCOSE BLD-MCNC: 180 MG/DL (ref 70–99)
GLUCOSE BLD-MCNC: 264 MG/DL (ref 70–99)
GLUCOSE BLD-MCNC: 344 MG/DL (ref 70–99)
L. PNEUMOPHILA SEROGP 1 UR AG: NORMAL
LYMPHOCYTES # BLD: 3 %
LYMPHOCYTES, BAL: 4 % (ref 5–10)
MAGNESIUM: 1.5 MG/DL (ref 1.8–2.4)
NUMBER OF CELLS COUNTED BAL (LAVAGE): 100
PERFORMED ON: ABNORMAL
PHOSPHORUS: 2 MG/DL (ref 2.5–4.9)
POTASSIUM SERPL-SCNC: 4.1 MMOL/L (ref 3.5–5.1)
RBC, BAL: 525 /CUMM
SEGMENTED NEUTROPHILS, BAL: 93 % (ref 5–10)
SODIUM BLD-SCNC: 128 MMOL/L (ref 136–145)
WBC/EPI CELLS BAL: 975 /CUMM

## 2022-12-21 PROCEDURE — 88177 CYTP FNA EVAL EA ADDL: CPT

## 2022-12-21 PROCEDURE — 83735 ASSAY OF MAGNESIUM: CPT

## 2022-12-21 PROCEDURE — 6370000000 HC RX 637 (ALT 250 FOR IP): Performed by: INTERNAL MEDICINE

## 2022-12-21 PROCEDURE — 31652 BRONCH EBUS SAMPLNG 1/2 NODE: CPT | Performed by: INTERNAL MEDICINE

## 2022-12-21 PROCEDURE — 0BD48ZX EXTRACTION OF RIGHT UPPER LOBE BRONCHUS, VIA NATURAL OR ARTIFICIAL OPENING ENDOSCOPIC, DIAGNOSTIC: ICD-10-PCS | Performed by: INTERNAL MEDICINE

## 2022-12-21 PROCEDURE — 0B9C8ZX DRAINAGE OF RIGHT UPPER LUNG LOBE, VIA NATURAL OR ARTIFICIAL OPENING ENDOSCOPIC, DIAGNOSTIC: ICD-10-PCS | Performed by: INTERNAL MEDICINE

## 2022-12-21 PROCEDURE — 36415 COLL VENOUS BLD VENIPUNCTURE: CPT

## 2022-12-21 PROCEDURE — 6360000002 HC RX W HCPCS: Performed by: INTERNAL MEDICINE

## 2022-12-21 PROCEDURE — 87070 CULTURE OTHR SPECIMN AEROBIC: CPT

## 2022-12-21 PROCEDURE — 2580000003 HC RX 258: Performed by: INTERNAL MEDICINE

## 2022-12-21 PROCEDURE — 71045 X-RAY EXAM CHEST 1 VIEW: CPT

## 2022-12-21 PROCEDURE — 88104 CYTOPATH FL NONGYN SMEARS: CPT

## 2022-12-21 PROCEDURE — 87205 SMEAR GRAM STAIN: CPT

## 2022-12-21 PROCEDURE — 87632 RESP VIRUS 6-11 TARGETS: CPT

## 2022-12-21 PROCEDURE — 87015 SPECIMEN INFECT AGNT CONCNTJ: CPT

## 2022-12-21 PROCEDURE — 3209999900 FLUORO FOR SURGICAL PROCEDURES

## 2022-12-21 PROCEDURE — 3609011800 HC BRONCHOSCOPY/TRANSBRONCHIAL LUNG BIOPSY: Performed by: INTERNAL MEDICINE

## 2022-12-21 PROCEDURE — 87102 FUNGUS ISOLATION CULTURE: CPT

## 2022-12-21 PROCEDURE — 0BJ08ZZ INSPECTION OF TRACHEOBRONCHIAL TREE, VIA NATURAL OR ARTIFICIAL OPENING ENDOSCOPIC: ICD-10-PCS | Performed by: INTERNAL MEDICINE

## 2022-12-21 PROCEDURE — 31629 BRONCHOSCOPY/NEEDLE BX EACH: CPT | Performed by: INTERNAL MEDICINE

## 2022-12-21 PROCEDURE — 3700000001 HC ADD 15 MINUTES (ANESTHESIA): Performed by: INTERNAL MEDICINE

## 2022-12-21 PROCEDURE — 2060000000 HC ICU INTERMEDIATE R&B

## 2022-12-21 PROCEDURE — 2580000003 HC RX 258: Performed by: NURSE ANESTHETIST, CERTIFIED REGISTERED

## 2022-12-21 PROCEDURE — 3700000000 HC ANESTHESIA ATTENDED CARE: Performed by: INTERNAL MEDICINE

## 2022-12-21 PROCEDURE — 31624 DX BRONCHOSCOPE/LAVAGE: CPT | Performed by: INTERNAL MEDICINE

## 2022-12-21 PROCEDURE — 7100000011 HC PHASE II RECOVERY - ADDTL 15 MIN: Performed by: INTERNAL MEDICINE

## 2022-12-21 PROCEDURE — 31628 BRONCHOSCOPY/LUNG BX EACH: CPT | Performed by: INTERNAL MEDICINE

## 2022-12-21 PROCEDURE — 88334 PATH CONSLTJ SURG CYTO XM EA: CPT

## 2022-12-21 PROCEDURE — C9113 INJ PANTOPRAZOLE SODIUM, VIA: HCPCS | Performed by: INTERNAL MEDICINE

## 2022-12-21 PROCEDURE — 87206 SMEAR FLUORESCENT/ACID STAI: CPT

## 2022-12-21 PROCEDURE — 7100000000 HC PACU RECOVERY - FIRST 15 MIN: Performed by: INTERNAL MEDICINE

## 2022-12-21 PROCEDURE — 2500000003 HC RX 250 WO HCPCS: Performed by: INTERNAL MEDICINE

## 2022-12-21 PROCEDURE — 3609011100 HC BRONCHOSCOPY BRUSHINGS: Performed by: INTERNAL MEDICINE

## 2022-12-21 PROCEDURE — 71046 X-RAY EXAM CHEST 2 VIEWS: CPT

## 2022-12-21 PROCEDURE — 2500000003 HC RX 250 WO HCPCS: Performed by: NURSE ANESTHETIST, CERTIFIED REGISTERED

## 2022-12-21 PROCEDURE — 88112 CYTOPATH CELL ENHANCE TECH: CPT

## 2022-12-21 PROCEDURE — 88173 CYTOPATH EVAL FNA REPORT: CPT

## 2022-12-21 PROCEDURE — 88305 TISSUE EXAM BY PATHOLOGIST: CPT

## 2022-12-21 PROCEDURE — 87116 MYCOBACTERIA CULTURE: CPT

## 2022-12-21 PROCEDURE — 31623 DX BRONCHOSCOPE/BRUSH: CPT | Performed by: INTERNAL MEDICINE

## 2022-12-21 PROCEDURE — 89051 BODY FLUID CELL COUNT: CPT

## 2022-12-21 PROCEDURE — 2709999900 HC NON-CHARGEABLE SUPPLY: Performed by: INTERNAL MEDICINE

## 2022-12-21 PROCEDURE — 3603165200 HC BRNCHSC EBUS GUIDED SAMPL 1/2 NODE STATION/STRUX: Performed by: INTERNAL MEDICINE

## 2022-12-21 PROCEDURE — 6360000002 HC RX W HCPCS: Performed by: NURSE ANESTHETIST, CERTIFIED REGISTERED

## 2022-12-21 PROCEDURE — 80048 BASIC METABOLIC PNL TOTAL CA: CPT

## 2022-12-21 PROCEDURE — 99233 SBSQ HOSP IP/OBS HIGH 50: CPT | Performed by: INTERNAL MEDICINE

## 2022-12-21 PROCEDURE — 7100000001 HC PACU RECOVERY - ADDTL 15 MIN: Performed by: INTERNAL MEDICINE

## 2022-12-21 PROCEDURE — 3609011700 HC BRONCHOSCOPY/TRANSBRONCHIAL LUNG BIOPSY ADDL LOBE: Performed by: INTERNAL MEDICINE

## 2022-12-21 PROCEDURE — 84100 ASSAY OF PHOSPHORUS: CPT

## 2022-12-21 PROCEDURE — 2720000010 HC SURG SUPPLY STERILE: Performed by: INTERNAL MEDICINE

## 2022-12-21 PROCEDURE — 88172 CYTP DX EVAL FNA 1ST EA SITE: CPT

## 2022-12-21 PROCEDURE — 7100000010 HC PHASE II RECOVERY - FIRST 15 MIN: Performed by: INTERNAL MEDICINE

## 2022-12-21 RX ORDER — SODIUM CHLORIDE 9 MG/ML
INJECTION, SOLUTION INTRAVENOUS PRN
Status: CANCELLED | OUTPATIENT
Start: 2022-12-21

## 2022-12-21 RX ORDER — DEXAMETHASONE SODIUM PHOSPHATE 4 MG/ML
4 INJECTION, SOLUTION INTRA-ARTICULAR; INTRALESIONAL; INTRAMUSCULAR; INTRAVENOUS; SOFT TISSUE
Status: CANCELLED | OUTPATIENT
Start: 2022-12-21 | End: 2022-12-22

## 2022-12-21 RX ORDER — MAGNESIUM SULFATE IN WATER 40 MG/ML
2000 INJECTION, SOLUTION INTRAVENOUS ONCE
Status: COMPLETED | OUTPATIENT
Start: 2022-12-21 | End: 2022-12-21

## 2022-12-21 RX ORDER — MEPERIDINE HYDROCHLORIDE 50 MG/ML
12.5 INJECTION INTRAMUSCULAR; INTRAVENOUS; SUBCUTANEOUS EVERY 5 MIN PRN
Status: CANCELLED | OUTPATIENT
Start: 2022-12-21

## 2022-12-21 RX ORDER — DEXAMETHASONE SODIUM PHOSPHATE 4 MG/ML
INJECTION, SOLUTION INTRA-ARTICULAR; INTRALESIONAL; INTRAMUSCULAR; INTRAVENOUS; SOFT TISSUE PRN
Status: DISCONTINUED | OUTPATIENT
Start: 2022-12-21 | End: 2022-12-21 | Stop reason: SDUPTHER

## 2022-12-21 RX ORDER — SODIUM CHLORIDE 0.9 % (FLUSH) 0.9 %
5-40 SYRINGE (ML) INJECTION PRN
Status: CANCELLED | OUTPATIENT
Start: 2022-12-21

## 2022-12-21 RX ORDER — HYDRALAZINE HYDROCHLORIDE 20 MG/ML
10 INJECTION INTRAMUSCULAR; INTRAVENOUS
Status: CANCELLED | OUTPATIENT
Start: 2022-12-21

## 2022-12-21 RX ORDER — SODIUM CHLORIDE, SODIUM LACTATE, POTASSIUM CHLORIDE, CALCIUM CHLORIDE 600; 310; 30; 20 MG/100ML; MG/100ML; MG/100ML; MG/100ML
INJECTION, SOLUTION INTRAVENOUS CONTINUOUS PRN
Status: DISCONTINUED | OUTPATIENT
Start: 2022-12-21 | End: 2022-12-21 | Stop reason: SDUPTHER

## 2022-12-21 RX ORDER — MIDAZOLAM HYDROCHLORIDE 1 MG/ML
INJECTION INTRAMUSCULAR; INTRAVENOUS PRN
Status: DISCONTINUED | OUTPATIENT
Start: 2022-12-21 | End: 2022-12-21 | Stop reason: SDUPTHER

## 2022-12-21 RX ORDER — PROPOFOL 10 MG/ML
INJECTION, EMULSION INTRAVENOUS PRN
Status: DISCONTINUED | OUTPATIENT
Start: 2022-12-21 | End: 2022-12-21 | Stop reason: SDUPTHER

## 2022-12-21 RX ORDER — ROCURONIUM BROMIDE 10 MG/ML
INJECTION, SOLUTION INTRAVENOUS PRN
Status: DISCONTINUED | OUTPATIENT
Start: 2022-12-21 | End: 2022-12-21 | Stop reason: SDUPTHER

## 2022-12-21 RX ORDER — SODIUM CHLORIDE 0.9 % (FLUSH) 0.9 %
5-40 SYRINGE (ML) INJECTION PRN
Status: DISCONTINUED | OUTPATIENT
Start: 2022-12-21 | End: 2022-12-24 | Stop reason: HOSPADM

## 2022-12-21 RX ORDER — DIPHENHYDRAMINE HYDROCHLORIDE 50 MG/ML
12.5 INJECTION INTRAMUSCULAR; INTRAVENOUS
Status: CANCELLED | OUTPATIENT
Start: 2022-12-21 | End: 2022-12-22

## 2022-12-21 RX ORDER — SODIUM CHLORIDE, SODIUM LACTATE, POTASSIUM CHLORIDE, CALCIUM CHLORIDE 600; 310; 30; 20 MG/100ML; MG/100ML; MG/100ML; MG/100ML
INJECTION, SOLUTION INTRAVENOUS CONTINUOUS
Status: CANCELLED | OUTPATIENT
Start: 2022-12-21

## 2022-12-21 RX ORDER — ACETAMINOPHEN 325 MG/1
650 TABLET ORAL
Status: CANCELLED | OUTPATIENT
Start: 2022-12-21 | End: 2022-12-22

## 2022-12-21 RX ORDER — ONDANSETRON 2 MG/ML
INJECTION INTRAMUSCULAR; INTRAVENOUS PRN
Status: DISCONTINUED | OUTPATIENT
Start: 2022-12-21 | End: 2022-12-21 | Stop reason: SDUPTHER

## 2022-12-21 RX ORDER — FENTANYL CITRATE 50 UG/ML
INJECTION, SOLUTION INTRAMUSCULAR; INTRAVENOUS PRN
Status: DISCONTINUED | OUTPATIENT
Start: 2022-12-21 | End: 2022-12-21 | Stop reason: SDUPTHER

## 2022-12-21 RX ORDER — SODIUM CHLORIDE 0.9 % (FLUSH) 0.9 %
5-40 SYRINGE (ML) INJECTION EVERY 12 HOURS SCHEDULED
Status: CANCELLED | OUTPATIENT
Start: 2022-12-21

## 2022-12-21 RX ORDER — SODIUM CHLORIDE 0.9 % (FLUSH) 0.9 %
5-40 SYRINGE (ML) INJECTION EVERY 12 HOURS SCHEDULED
Status: DISCONTINUED | OUTPATIENT
Start: 2022-12-21 | End: 2022-12-24 | Stop reason: HOSPADM

## 2022-12-21 RX ORDER — IPRATROPIUM BROMIDE AND ALBUTEROL SULFATE 2.5; .5 MG/3ML; MG/3ML
1 SOLUTION RESPIRATORY (INHALATION)
Status: CANCELLED | OUTPATIENT
Start: 2022-12-21 | End: 2022-12-22

## 2022-12-21 RX ORDER — LIDOCAINE HYDROCHLORIDE 20 MG/ML
INJECTION, SOLUTION EPIDURAL; INFILTRATION; INTRACAUDAL; PERINEURAL PRN
Status: DISCONTINUED | OUTPATIENT
Start: 2022-12-21 | End: 2022-12-21 | Stop reason: SDUPTHER

## 2022-12-21 RX ORDER — ONDANSETRON 2 MG/ML
4 INJECTION INTRAMUSCULAR; INTRAVENOUS
Status: CANCELLED | OUTPATIENT
Start: 2022-12-21 | End: 2022-12-22

## 2022-12-21 RX ORDER — MIDAZOLAM HYDROCHLORIDE 1 MG/ML
2 INJECTION INTRAMUSCULAR; INTRAVENOUS
Status: CANCELLED | OUTPATIENT
Start: 2022-12-21 | End: 2022-12-22

## 2022-12-21 RX ADMIN — DEXAMETHASONE SODIUM PHOSPHATE 8 MG: 4 INJECTION, SOLUTION INTRAMUSCULAR; INTRAVENOUS at 13:54

## 2022-12-21 RX ADMIN — MIDAZOLAM HYDROCHLORIDE 2 MG: 2 INJECTION, SOLUTION INTRAMUSCULAR; INTRAVENOUS at 13:10

## 2022-12-21 RX ADMIN — SODIUM CHLORIDE, PRESERVATIVE FREE 10 ML: 5 INJECTION INTRAVENOUS at 22:03

## 2022-12-21 RX ADMIN — AZITHROMYCIN MONOHYDRATE 500 MG: 500 INJECTION, POWDER, LYOPHILIZED, FOR SOLUTION INTRAVENOUS at 16:09

## 2022-12-21 RX ADMIN — PROPOFOL 200 MG: 10 INJECTION, EMULSION INTRAVENOUS at 13:20

## 2022-12-21 RX ADMIN — SUGAMMADEX 200 MG: 100 INJECTION, SOLUTION INTRAVENOUS at 13:59

## 2022-12-21 RX ADMIN — LIDOCAINE HYDROCHLORIDE 50 MG: 20 INJECTION, SOLUTION EPIDURAL; INFILTRATION; INTRACAUDAL; PERINEURAL at 13:20

## 2022-12-21 RX ADMIN — PANTOPRAZOLE SODIUM 40 MG: 40 INJECTION, POWDER, FOR SOLUTION INTRAVENOUS at 09:16

## 2022-12-21 RX ADMIN — FENTANYL CITRATE 75 MCG: 50 INJECTION, SOLUTION INTRAMUSCULAR; INTRAVENOUS at 13:20

## 2022-12-21 RX ADMIN — BENZONATATE 100 MG: 100 CAPSULE ORAL at 05:15

## 2022-12-21 RX ADMIN — SODIUM PHOSPHATE, MONOBASIC, MONOHYDRATE 15 MMOL: 276; 142 INJECTION, SOLUTION INTRAVENOUS at 17:20

## 2022-12-21 RX ADMIN — SODIUM CHLORIDE, SODIUM GLUCONATE, SODIUM ACETATE, POTASSIUM CHLORIDE AND MAGNESIUM CHLORIDE: 526; 502; 368; 37; 30 INJECTION, SOLUTION INTRAVENOUS at 02:23

## 2022-12-21 RX ADMIN — ENOXAPARIN SODIUM 40 MG: 100 INJECTION SUBCUTANEOUS at 09:16

## 2022-12-21 RX ADMIN — ONDANSETRON 4 MG: 2 INJECTION INTRAMUSCULAR; INTRAVENOUS at 13:54

## 2022-12-21 RX ADMIN — CEFTRIAXONE SODIUM 1000 MG: 1 INJECTION, POWDER, FOR SOLUTION INTRAMUSCULAR; INTRAVENOUS at 16:07

## 2022-12-21 RX ADMIN — FENTANYL CITRATE 50 MCG: 50 INJECTION, SOLUTION INTRAMUSCULAR; INTRAVENOUS at 13:53

## 2022-12-21 RX ADMIN — SODIUM CHLORIDE, SODIUM GLUCONATE, SODIUM ACETATE, POTASSIUM CHLORIDE AND MAGNESIUM CHLORIDE: 526; 502; 368; 37; 30 INJECTION, SOLUTION INTRAVENOUS at 17:13

## 2022-12-21 RX ADMIN — MUPIROCIN: 20 OINTMENT TOPICAL at 09:17

## 2022-12-21 RX ADMIN — MAGNESIUM SULFATE HEPTAHYDRATE 2000 MG: 40 INJECTION, SOLUTION INTRAVENOUS at 17:12

## 2022-12-21 RX ADMIN — SODIUM CHLORIDE, PRESERVATIVE FREE 10 ML: 5 INJECTION INTRAVENOUS at 09:17

## 2022-12-21 RX ADMIN — ROCURONIUM BROMIDE 50 MG: 10 SOLUTION INTRAVENOUS at 13:20

## 2022-12-21 RX ADMIN — SODIUM CHLORIDE, SODIUM LACTATE, POTASSIUM CHLORIDE, AND CALCIUM CHLORIDE: .6; .31; .03; .02 INJECTION, SOLUTION INTRAVENOUS at 13:09

## 2022-12-21 ASSESSMENT — PAIN - FUNCTIONAL ASSESSMENT: PAIN_FUNCTIONAL_ASSESSMENT: 0-10

## 2022-12-21 ASSESSMENT — PAIN SCALES - GENERAL
PAINLEVEL_OUTOF10: 0
PAINLEVEL_OUTOF10: 3
PAINLEVEL_OUTOF10: 0
PAINLEVEL_OUTOF10: 3
PAINLEVEL_OUTOF10: 0
PAINLEVEL_OUTOF10: 0

## 2022-12-21 ASSESSMENT — PAIN DESCRIPTION - LOCATION: LOCATION: HEAD

## 2022-12-21 NOTE — CARE COORDINATION
Chart review. Patient off floor in endo. Has PCP listed as Tor Clark. Patient has insurance through [facility]. Patient was sent to ED from PCP office after labs revealed acute on chronic hyponatremia . Patient has complex medical history including ETOH abuse and pancreatic insufficiency. Uncontrolled diabetes with early DKA. Also with multifocal pneumonia. Nephrology consulted to assist with fluid management due to avoid overly aggressive correction. On IV Zosyn and Zithromax for pneumonia. Will follow up with patient when back in room and follow for DCP needs.

## 2022-12-21 NOTE — RT PROTOCOL NOTE
RT Inhaler-Nebulizer Bronchodilator Protocol Note    There is a bronchodilator order in the chart from a provider indicating to follow the RT Bronchodilator Protocol and there is an Initiate RT Inhaler-Nebulizer Bronchodilator Protocol order as well (see protocol at bottom of note). CXR Findings:  No results found. The findings from the last RT Protocol Assessment were as follows:   History Pulmonary Disease: Smoker 15 pack years or more  Respiratory Pattern: Regular pattern and RR 12-20 bpm  Breath Sounds: Slightly diminished and/or crackles  Cough: Strong, productive  Indication for Bronchodilator Therapy: None  Bronchodilator Assessment Score: 4    Aerosolized bronchodilator medication orders have been revised according to the RT Inhaler-Nebulizer Bronchodilator Protocol below. Respiratory Therapist to perform RT Therapy Protocol Assessment initially then follow the protocol. Repeat RT Therapy Protocol Assessment PRN for score 0-3 or on second treatment, BID, and PRN for scores above 3. No Indications - adjust the frequency to every 6 hours PRN wheezing or bronchospasm, if no treatments needed after 48 hours then discontinue using Per Protocol order mode. If indication present, adjust the RT bronchodilator orders based on the Bronchodilator Assessment Score as indicated below. Use Inhaler orders unless patient has one or more of the following: on home nebulizer, not able to hold breath for 10 seconds, is not alert and oriented, cannot activate and use MDI correctly, or respiratory rate 25 breaths per minute or more, then use the equivalent nebulizer order(s) with same Frequency and PRN reasons based on the score. If a patient is on this medication at home then do not decrease Frequency below that used at home.     0-3 - enter or revise RT bronchodilator order(s) to equivalent RT Bronchodilator order with Frequency of every 4 hours PRN for wheezing or increased work of breathing using Per Protocol order mode. 4-6 - enter or revise RT Bronchodilator order(s) to two equivalent RT bronchodilator orders with one order with BID Frequency and one order with Frequency of every 4 hours PRN wheezing or increased work of breathing using Per Protocol order mode. 7-10 - enter or revise RT Bronchodilator order(s) to two equivalent RT bronchodilator orders with one order with TID Frequency and one order with Frequency of every 4 hours PRN wheezing or increased work of breathing using Per Protocol order mode. 11-13 - enter or revise RT Bronchodilator order(s) to one equivalent RT bronchodilator order with QID Frequency and an Albuterol order with Frequency of every 4 hours PRN wheezing or increased work of breathing using Per Protocol order mode. Greater than 13 - enter or revise RT Bronchodilator order(s) to one equivalent RT bronchodilator order with every 4 hours Frequency and an Albuterol order with Frequency of every 2 hours PRN wheezing or increased work of breathing using Per Protocol order mode. RT to enter RT Home Evaluation for COPD & MDI Assessment order using Per Protocol order mode.     Electronically signed by Nannette Bansal RCP on 12/20/2022 at 9:00 PM

## 2022-12-21 NOTE — PLAN OF CARE
Problem: Pain  Goal: Verbalizes/displays adequate comfort level or baseline comfort level  Outcome: Progressing  Flowsheets (Taken 12/21/2022 0131)  Verbalizes/displays adequate comfort level or baseline comfort level:   Encourage patient to monitor pain and request assistance   Assess pain using appropriate pain scale   Administer analgesics based on type and severity of pain and evaluate response   Implement non-pharmacological measures as appropriate and evaluate response  Note: HA relieved by PRN medication administration.      Problem: Respiratory - Adult  Goal: Achieves optimal ventilation and oxygenation  Outcome: Progressing  Flowsheets (Taken 12/21/2022 0131)  Achieves optimal ventilation and oxygenation:   Assess for changes in respiratory status   Assess for changes in mentation and behavior   Position to facilitate oxygenation and minimize respiratory effort   Oxygen supplementation based on oxygen saturation or arterial blood gases   Initiate smoking cessation protocol as indicated   Assess the need for suctioning and aspirate as needed   Assess and instruct to report shortness of breath or any respiratory difficulty   Respiratory therapy support as indicated     Problem: Metabolic/Fluid and Electrolytes - Adult  Goal: Electrolytes maintained within normal limits  Outcome: Progressing  Flowsheets (Taken 12/21/2022 0131)  Electrolytes maintained within normal limits: Monitor labs and assess patient for signs and symptoms of electrolyte imbalances  Goal: Glucose maintained within prescribed range  Outcome: Progressing  Flowsheets (Taken 12/21/2022 0131)  Glucose maintained within prescribed range:   Monitor blood glucose as ordered   Assess for signs and symptoms of hyperglycemia and hypoglycemia   Administer ordered medications to maintain glucose within target range   Instruct patient on self management of diabetes and initiate consult as needed

## 2022-12-21 NOTE — ANESTHESIA PRE PROCEDURE
Department of Anesthesiology  Preprocedure Note       Name:  Conor Chua   Age:  61 y.o.  :  1965                                          MRN:  [id number]         Date:  2022      Surgeon: Celeste Shelton):  Genny Wilson MD    Procedure: Procedure(s):  BRONCHOSCOPY ENDOBRONCHIAL ULTRASOUND with fluorscopy    Medications prior to admission:   Prior to Admission medications    Medication Sig Start Date End Date Taking?  Authorizing Provider   citalopram (CELEXA) 40 MG tablet Take 40 mg by mouth daily 10/21/22  Yes Historical Provider, MD   Insulin Degludec (TRESIBA FLEXTOUCH) 100 UNIT/ML SOPN Take 12 units daily 21  Yes Historical Provider, MD   insulin lispro, 1 Unit Dial, (HUMALOG/ADMELOG) 100 UNIT/ML SOPN 4 units with breakfast, lunch and dinner plus scale for maXIMUM OF 30 21  Yes Historical Provider, MD   lisinopril (PRINIVIL;ZESTRIL) 5 MG tablet Take 5 mg by mouth daily 10/21/22  Yes Historical Provider, MD   lovastatin (MEVACOR) 20 MG tablet Take 20 mg by mouth nightly 10/21/22  Yes Historical Provider, MD   metFORMIN (GLUCOPHAGE) 1000 MG tablet Take 1,000 mg by mouth 2 times daily (with meals) 10/21/22  Yes Historical Provider, MD   lipase-protease-amylase (CREON) 86603-125907 units CPEP delayed release capsule Take 72,000 Units by mouth 3 times daily (with meals) 21  Yes Historical Provider, MD   traZODone (DESYREL) 100 MG tablet Take 100 mg by mouth nightly 10/21/22  Yes Historical Provider, MD   Multiple Vitamins-Minerals (THERAPEUTIC MULTIVITAMIN-MINERALS) tablet Take 1 tablet by mouth daily    Historical Provider, MD       Current medications:    Current Facility-Administered Medications   Medication Dose Route Frequency Provider Last Rate Last Admin    mupirocin (BACTROBAN) 2 % ointment   Nasal BID Genny Wilson MD   Given at 22 09    sodium chloride flush 0.9 % injection 5-40 mL  5-40 mL IntraVENous 2 times per day Genny Wilson MD   10 mL at 22 0917    sodium chloride flush 0.9 % injection 5-40 mL  5-40 mL IntraVENous PRN Anthony Vu MD        cefTRIAXone (ROCEPHIN) 1,000 mg in dextrose 5 % 50 mL IVPB mini-bag  1,000 mg IntraVENous Q24H Anthony Vu MD        azithromycin (ZITHROMAX) 500 mg in D5W 250ml addavial  500 mg IntraVENous Q24H Anthony Vu MD        electrolyte-A [facility]) solution   IntraVENous Continuous Anthony Vu  mL/hr at 12/21/22 0223 New Bag at 12/21/22 0223    insulin lispro (HUMALOG) injection vial 0-8 Units  0-8 Units SubCUTAneous TID WC Anthony Vu MD        insulin lispro (HUMALOG) injection vial 0-4 Units  0-4 Units SubCUTAneous Nightly Anthony Vu MD        enoxaparin (LOVENOX) injection 40 mg  40 mg SubCUTAneous Daily Anthony Vu MD   40 mg at 12/21/22 0916    pantoprazole (PROTONIX) injection 40 mg  40 mg IntraVENous Daily Anthony Vu MD   40 mg at 12/21/22 0916    benzonatate (TESSALON) capsule 100 mg  100 mg Oral TID PRN Anthony Vu MD   100 mg at 12/21/22 0515    ipratropium-albuterol (Griselda Listen) nebulizer solution 1 ampule  1 ampule Inhalation Q4H PRN Adele Culver MD        acetaminophen (TYLENOL) tablet 650 mg  650 mg Oral Q4H PRN Fran Camara MD   650 mg at 12/20/22 2117    nicotine (NICODERM CQ) 21 MG/24HR 1 patch  1 patch TransDERmal Daily Fran Camara MD   1 patch at 12/21/22 4024       Allergies:  No Known Allergies    Problem List:    Patient Active Problem List   Diagnosis Code    Increased anion gap metabolic acidosis M50.28    Acute hyponatremia E87.1    Multifocal pneumonia J18.9    Uncontrolled type 2 diabetes mellitus with hyperglycemia (HCC) E11.65    Tobacco abuse Z72.0    Diabetic ketoacidosis associated with type 2 diabetes mellitus ([address] E11.10    DKA, type 2, not at goal [facility]) E11.10    Exocrine pancreatic insufficiency K86.81    Alcohol use disorder, severe, in sustained remission ([facility] [address] F10.21    Normal anion gap metabolic acidosis W29.32    Excessive weight loss R63.4    Leukocytosis D72.829    Engages in vaping Z72.89       Past Medical History:        Diagnosis Date    Diabetes mellitus ([address]        Past Surgical History:  History reviewed. No pertinent surgical history. Social History:    Social History     Tobacco Use    Smoking status: Some Days     Types: Cigarettes    Smokeless tobacco: Never   Substance Use Topics    Alcohol use: Not Currently                                Ready to quit: Not Answered  Counseling given: Not Answered      Vital Signs (Current):   Vitals:    12/21/22 0800 12/21/22 0802 12/21/22 1002 12/21/22 1024   BP:  118/65 115/66 (!) 96/55   Pulse: 98 97 95 (!) 113   Resp:  (!) 36 28 17   Temp:  98.8 °F (37.1 °C)  98.9 °F (37.2 °C)   TempSrc:  Oral  Oral   SpO2:  97% 96% 97%   Weight:       Height:                                                  BP Readings from Last 3 Encounters:   12/21/22 (!) 96/55       NPO Status:                                                                                 BMI:   Wt Readings from Last 3 Encounters:   12/20/22 190 lb (86.2 kg)     Body mass index is 25.77 kg/m².     CBC:   Lab Results   Component Value Date/Time    WBC 20.9 12/20/2022 01:14 PM    RBC 3.58 12/20/2022 01:14 PM    HGB 11.7 12/20/2022 01:14 PM    HCT 33.3 12/20/2022 01:14 PM    MCV 93.2 12/20/2022 01:14 PM    RDW 12.2 12/20/2022 01:14 PM     12/20/2022 01:14 PM       CMP:   Lab Results   Component Value Date/Time     12/21/2022 08:42 AM    K 4.1 12/21/2022 08:42 AM    K 3.9 12/20/2022 03:17 PM    CL 93 12/21/2022 08:42 AM    CO2 21 12/21/2022 08:42 AM    BUN <2 12/21/2022 08:42 AM    CREATININE <0.5 12/21/2022 08:42 AM    GFRAA >60 09/21/2010 09:13 PM    AGRATIO 1.1 12/20/2022 01:14 PM    LABGLOM >60 12/21/2022 08:42 AM    GLUCOSE 164 12/21/2022 08:42 AM    PROT 7.0 12/20/2022 01:14 PM    PROT 8.6 09/21/2010 09:13 PM    CALCIUM 8.5 12/21/2022 08:42 AM    BILITOT 0.4 12/20/2022 01:14 PM    ALKPHOS 108 12/20/2022 01:14 PM    AST 21 12/20/2022 01:14 PM    ALT 14 12/20/2022 01:14 PM       POC Tests:   Recent Labs     12/21/22  0915   POCGLU 175*       Coags: No results found for: PROTIME, INR, APTT    HCG (If Applicable): No results found for: PREGTESTUR, PREGSERUM, HCG, HCGQUANT     ABGs: No results found for: PHART, PO2ART, XWP3IJJ, ZFC3UWA, BEART, U3SEVNSW     Type & Screen (If Applicable):  No results found for: LABABO, LABRH    Drug/Infectious Status (If Applicable):  No results found for: HIV, HEPCAB    COVID-19 Screening (If Applicable):   Lab Results   Component Value Date/Time    COVID19 NOT DETECTED 12/20/2022 01:31 PM           Anesthesia Evaluation  Patient summary reviewed and Nursing notes reviewed  Airway: Mallampati: II  TM distance: >3 FB   Neck ROM: full  Mouth opening: > = 3 FB   Dental: normal exam         Pulmonary:normal exam    (+) pneumonia:                             Cardiovascular:Negative CV ROS                      Neuro/Psych:   Negative Neuro/Psych ROS              GI/Hepatic/Renal: Neg GI/Hepatic/Renal ROS  (+) liver disease (alcohol abuse/pancreatitis):,           Endo/Other:    (+) DiabetesType I DM, using insulin, malignancy/cancer (lung nodule). Abdominal:             Vascular: negative vascular ROS. Other Findings:           Anesthesia Plan      general     ASA 4       Induction: intravenous. MIPS: Postoperative opioids intended. Anesthetic plan and risks discussed with patient. Plan discussed with CRNA.     Attending anesthesiologist reviewed and agrees with Preprocedure content                VISHAL Domingo MD   12/21/2022

## 2022-12-21 NOTE — PROGRESS NOTES
Hospitalist ICU Progress Note    CC: <principal problem not specified>    Hospital course:  61 y.o. male who presented to the ED directly from his PCPs office after routine blood work revealed acute on chronic severe hyponatremia (118) and left shifted leukocytosis. Upon further questioning patient reports that he has been ill for greater than 1 weeks duration with fever, chills, runny nose and cough. He is unaware of any recent sick exposures, but is being evaluated during an influenza A outbreak. Patient has a complex medical history which includes pancreatic endocrine and exocrine insufficiency related to past EtOH abuse (currently in sustained remission x15 years). As a result, he is an insulin-dependent diabetic he reports that he was compliant with his regimen in most circumstances. Patient does continue to smoke tobacco.     Upon arrival to the ED patient was tachycardic, tachypneic, and with soft /59. Stat EKG demonstrates NSR without evidence of ischemia. CXR notable for right-sided multifocal pneumonia, as well as JACE pulmonary nodule. Influenza A/B and SARS-CoV-2 testing performed and noted to be negative. Labs obtained and notable for increased anion gap metabolic acidosis, acute hyperglycemia, large ketonuria consistent with DKA. Other lab abnormalities include: Acute on chronic hyponatremia 118, left shifted leukocytosis 20.9, and anemia with H/H 11.7/33. 3. Patient initiated on 2 L IV fluid bolus per ED attending prior to admit request.  He also received a dosage of IV Rocephin and Zithromax following collection of blood cultures. Ultimately patient will require ICU admission to assist with DKA, severe hyponatremia, and multifocal pneumonia. Pt did not require insulin drip. Glucose was improving.     Admit date: 12/20/2022  Days in hospital:  1    24 Hour Events: pt with multiple coughing episodes    Subjective: going for bronch today - he is worried about his parents - mother had significant stroke and dad is caring for her - his sodium has improved and mentation is normal    ROS:  A comprehensive review of systems was negative except for: cough with sputum, shortness of breath      Objective:    VITALS:  /74   Pulse (!) 109   Temp 98 °F (36.7 °C)   Resp 18   Ht 6' (1.829 m)   Wt 183 lb 4.8 oz (83.1 kg)   SpO2 96%   BMI 24.86 kg/m²     Gen: ill appearing  HEENT: NC/AT, moist mucous membranes, no oropharyngeal erythema or exudate    Neck: supple, trachea midline, no anterior cervical or SC LAD  Heart:  Normal s1/s2, RRR, no murmurs, gallops, or rubs. no leg edema  Lungs:  some rhonchi bilaterally, no wheeze, no rales, some bilat rhonchi, no crackles, no use of accessory muscles  Abd: bowel sounds present, soft, nontender, nondistended, no masses  Extrem:  No clubbing, cyanosis,  no edema, peripheral pulses 2+, brisk capillary refill  Skin: no rashes or lesions, normal color/perfusion  Psych:  A & O x3    Neuro: grossly intact, moves all four extremities. Radiology (personally reviewed by me):  CXR 12/21:    Impression:        No significant improvement. Persistent multifocal infiltrates and left upper   lobe lung nodule. No pneumothorax        Assessment:    Active Problems:    Acute hyponatremia    Multifocal pneumonia    Uncontrolled type 2 diabetes mellitus with hyperglycemia (HCC)    Tobacco abuse    Exocrine pancreatic insufficiency    Normal anion gap metabolic acidosis    Excessive weight loss    Leukocytosis    Engages in vaping    Alcohol use disorder, severe, in sustained remission ([facility] [address]  Resolved Problems:    * No resolved hospital problems.  *      Plan:   Hyponatremia - acute on chronic  - did correct rapidly - monitor closely for symptoms  Multifocal pneumonia - on ceftriaxone and azithromycin and lactoabcillus  Possible lung mass with lymphadenopathy - for bronch today  DM2 with uncontrolled with hyperglycemia - on SSI - has bronch today, then will restart his home insulin regimen        Prognosis:  Fair    Code status:  Full code    DVT prophylaxis: Lovenox  GI prophylaxis: Proton Pump Inhibitor  Antibiotic prophylaxis indicated:   yes - lactobacillus    Nasal decolonization:  Bactroban  Diet:  Diet NPO Exceptions are: Sips of Water with Meds, Ice Chips, Sips of Clear Liquids    Disposition:  Home    Medications:  Scheduled Meds:   mupirocin   Nasal BID    sodium chloride flush  5-40 mL IntraVENous 2 times per day    cefTRIAXone (ROCEPHIN) IV  1,000 mg IntraVENous Q24H    azithromycin  500 mg IntraVENous Q24H    insulin lispro  0-8 Units SubCUTAneous TID WC    insulin lispro  0-4 Units SubCUTAneous Nightly    enoxaparin  40 mg SubCUTAneous Daily    pantoprazole  40 mg IntraVENous Daily    nicotine  1 patch TransDERmal Daily       PRN Meds:  sodium chloride flush, benzonatate, ipratropium-albuterol, acetaminophen    IV:   electrolyte-A 125 mL/hr at 12/21/22 0223         Intake/Output Summary (Last 24 hours) at 12/21/2022 0845  Last data filed at 12/21/2022 0631  Gross per 24 hour   Intake 3788.13 ml   Output 3750 ml   Net 38.13 ml       Results:  CBC:   Recent Labs     12/20/22  1314   WBC 20.9*   HGB 11.7*   HCT 33.3*   MCV 93.2        BMP:   Recent Labs     12/20/22  1314 12/20/22  1517   * 120*   K 3.9 3.9   CL 84* 89*   CO2 15* 16*   BUN 5* 4*   CREATININE 0.8* 0.7*     Mag: No results for input(s): MAG in the last 72 hours. LIVER PROFILE:   Recent Labs     12/20/22  1314   AST 21   ALT 14   BILITOT 0.4   ALKPHOS 108     PT/INR: No results for input(s): PROTIME, INR in the last 72 hours. APTT: No results for input(s): APTT in the last 72 hours.   UA:  Recent Labs     12/20/22  1437   COLORU Yellow   PHUR 5.5   WBCUA 0-2   RBCUA 0-2   MUCUS Rare*   CLARITYU Clear   SPECGRAV 1.015   LEUKOCYTESUR Negative   UROBILINOGEN 0.2   BILIRUBINUR SMALL*   BLOODU TRACE-LYSED*   GLUCOSEU Negative       ABG: No results found for: PHART, PH, TTW4XZM, PCO2, PO2ART, PO2, IGM1FAH, HCO3, BEART, BE, THGBART, THB, YSU6PWC, B6JYICAT, O2SAT    Lab Results   Component Value Date    CALCIUM 8.1 (L) 12/20/2022             Electronically signed by Daniel Khan MD on 12/21/2022 at 8:45 AM

## 2022-12-21 NOTE — PLAN OF CARE
Problem: Discharge Planning  Goal: Discharge to home or other facility with appropriate resources  Outcome: Progressing     Problem: Pain  Goal: Verbalizes/displays adequate comfort level or baseline comfort level  12/21/2022 1039 by Haroldo Manuel RN  Outcome: Progressing     Problem: Respiratory - Adult  Goal: Achieves optimal ventilation and oxygenation  12/21/2022 1039 by Haroldo Manuel RN  Outcome: Progressing   Assess and instruct to report shortness of breath or any respiratory difficulty   Respiratory therapy support as indicated     Problem: Metabolic/Fluid and Electrolytes - Adult  Goal: Electrolytes maintained within normal limits  12/21/2022 1039 by Haroldo Manuel RN  Outcome: Progressing  Goal: Glucose maintained within prescribed range  12/21/2022 1039 by Haroldo Manuel RN  Outcome: Progressing   Instruct patient on self management of diabetes and initiate consult as needed     Problem: Chronic Conditions and Co-morbidities  Goal: Patient's chronic conditions and co-morbidity symptoms are monitored and maintained or improved  Outcome: Progressing     Haroldo Manuel RN

## 2022-12-21 NOTE — PROGRESS NOTES
INPATIENT PULMONARY CRITICAL CARE PROGRESS NOTE      Reason for visit     DKA    SUBJECTIVE: Patient when seen this morning was having less shortness of breath, patient was still having some paroxysmal coughing but it is less as compared to yesterday, patient was having less shortness of breath, no chest pain per se, patient was afebrile and had borderline hemodynamics, patient's blood sugar was slightly on the higher side, patient was not hypoxemic and was on room air oxygen with saturation of 97% when evaluated, patient was alert and oriented, patient has had good urine output overnight with cumulative fluid balance of +38 mL, no other pertinent review of system of concern       Physical Exam:  Blood pressure (!) 96/55, pulse (!) 113, temperature 98.9 °F (37.2 °C), temperature source Oral, resp. rate 17, height 6' (1.829 m), weight 190 lb (86.2 kg), SpO2 97 %.'     Constitutional: No respiratory distress with paroxysmal coughing spells which are much less than yesterday  HENT:  Oropharynx is clear and moist. No thyromegaly. Eyes:  Conjunctivae are normal. Pupils equal, round, and reactive to light. No scleral icterus. Neck: . No tracheal deviation present. No obvious thyroid mass. Cardiovascular: sinus tachycardia  normal heart sounds. No right ventricular heave. No lower extremity edema. Pulmonary/Chest: Bilateral decreased wheezes. Bilateral rales. decreased chest congestion chest wall is not dull to percussion. No accessory muscle usage or stridor. Some tachypnea  Abdominal: Soft. Bowel sounds present. No distension or hernia. No tenderness. Musculoskeletal: No cyanosis. No clubbing. No obvious joint deformity. Lymphadenopathy: No cervical or supraclavicular adenopathy. Skin: Skin is warm and dry. No rash or nodules on the exposed extremities. Psychiatric: Normal mood and affect. Behavior is normal.  No anxiety. Neurologic: Alert, awake and oriented. PERRL.   Speech fluent      Results:  CBC:   Recent Labs     12/20/22  1314   WBC 20.9*   HGB 11.7*   HCT 33.3*   MCV 93.2        BMP:   Recent Labs     12/20/22  1314 12/20/22  1517 12/21/22  0842   * 120* 128*   K 3.9 3.9 4.1   CL 84* 89* 93*   CO2 15* 16* 21   PHOS  --   --  2.0*   BUN 5* 4* <2*   CREATININE 0.8* 0.7* <0.5*     LIVER PROFILE:   Recent Labs     12/20/22  1314   AST 21   ALT 14   BILITOT 0.4   ALKPHOS 108       UA:  Recent Labs     12/20/22  1437   COLORU Yellow   PHUR 5.5   WBCUA 0-2   RBCUA 0-2   MUCUS Rare*   CLARITYU Clear   SPECGRAV 1.015   LEUKOCYTESUR Negative   UROBILINOGEN 0.2   BILIRUBINUR SMALL*   BLOODU TRACE-LYSED*   GLUCOSEU Negative       Imaging:  I have reviewed radiology images personally. CT CHEST WO CONTRAST   Final Result   Multifocal consolidative change seen throughout the lungs, greatest in the   right upper lobe, favored to be secondary to pneumonia. There is scattered   intervening septal thickening in the areas of consolidation on the right. Recommend follow-up to radiographic resolution to exclude underlying   neoplastic etiology in the areas of consolidation      Small mediastinal nodes are noted, likely reactive in the absence of a known   primary      RECOMMENDATIONS:            XR CHEST (2 VW)   Final Result   Multifocal pneumonia on the right      Small pulmonary nodule in left upper lobe, not clearly calcified. Consider   noncontrast chest CT for further characterization. RECOMMENDATION:              XR CHEST (2 VW)    Result Date: 12/20/2022  EXAMINATION: TWO XRAY VIEWS OF THE CHEST 12/20/2022 1:21 pm COMPARISON: None. HISTORY: ORDERING SYSTEM PROVIDED HISTORY: tachy, febrile, r/o PNA TECHNOLOGIST PROVIDED HISTORY: Reason for exam:->tachy, febrile, r/o PNA Reason for Exam: tachy, febrile, r/o PNA, cough FINDINGS: Multifocal parenchymal opacities are seen throughout the right lung. .  There is underlying emphysema.  There is a focal pulmonary nodule in the left upper lobe, not clearly calcified No significant pleural fluid. There is likely underlying emphysema. Multifocal pneumonia on the right Small pulmonary nodule in left upper lobe, not clearly calcified. Consider noncontrast chest CT for further characterization. CT CHEST WO CONTRAST    Result Date: 12/20/2022  EXAMINATION: CT OF THE CHEST WITHOUT CONTRAST 12/20/2022 4:21 pm TECHNIQUE: CT of the chest was performed without the administration of intravenous contrast. Multiplanar reformatted images are provided for review. Automated exposure control, iterative reconstruction, and/or weight based adjustment of the mA/kV was utilized to reduce the radiation dose to as low as reasonably achievable. COMPARISON: None. HISTORY: ORDERING SYSTEM PROVIDED HISTORY: Multifocal pn/hyponatremia/weight loss of 40 lbs/smoker TECHNOLOGIST PROVIDED HISTORY: Reason for exam:->Multifocal pn/hyponatremia/weight loss of 40 lbs/smoker Reason for Exam: pneumonia, cough x several weeks Relevant Medical/Surgical History: smoker x 40 yrs FINDINGS: Mediastinum: Thyroid gland is unremarkable. Atherosclerotic change seen in aorta. Trace pericardial fluid is seen. Small mediastinal and hilar nodes are noted. Index precarinal node measures 1.5 cm x 2.1 cm. Lungs/pleura: Respiratory motion artifact limits evaluation of fine pulmonary parenchymal change There is a focal nodule seen in the left upper lobe, which measures 9 mm. This contains areas of increased density internally, likely early calcification. Patchy nodularity is seen in the lingula. Scattered patchy nodularity is seen in the left lower lobe. .  No significant pleural fluid on the left. On the right, patchy and confluent parenchymal opacity is seen in the right upper lobe. There is intervening septal thickening seen. Celestia Bret Patchy ground-glass opacity is seen in the right middle lobe. Patchy ground-glass opacity is seen in the right lower lobe.  No significant pleural fluid on the right. Upper Abdomen: Right adrenal gland is normal.  Left adrenal gland is normal. Soft Tissues/Bones: Spurring is seen in the spine. Multifocal consolidative change seen throughout the lungs, greatest in the right upper lobe, favored to be secondary to pneumonia. There is scattered intervening septal thickening in the areas of consolidation on the right. Recommend follow-up to radiographic resolution to exclude underlying neoplastic etiology in the areas of consolidation Small mediastinal nodes are noted, likely reactive in the absence of a known primary           Assessment:  Active Problems:    Acute hyponatremia    Multifocal pneumonia    Uncontrolled type 2 diabetes mellitus with hyperglycemia (HCC)    Tobacco abuse    Exocrine pancreatic insufficiency    Normal anion gap metabolic acidosis    Excessive weight loss    Leukocytosis    Engages in vaping    Mediastinal adenopathy    Hypophosphatemia    Alcohol use disorder, severe, in sustained remission ([facility] [address]  Resolved Problems:    * No resolved hospital problems.  *          Plan:   Oxygen supplementation, if required, to keep saturation 90 and 94% only  When seen  Pulmonary toilet  Patient was shown the x-ray chest along with his findings, differential diagnosis and options  CT of the chest was shown to the patient along with findings, differential diagnosis along with implications and options  Patient can have pulm infiltrates secondary multifocal pneumonia and reactive adenopathy but differential diagnosis also includes adenocarcinoma of the lung-given the patient's history-patient was told about the options including a bronchoscopy with endobronchial ultrasound and needle biopsy and transbronchial biopsy under fluoroscopy with his intrinsic pros and cons and complications-patient wanted to proceed with that-we will arrange that for today  IV Rocephin and Zithromax to be continued  Urine antigen for Legionella was negative  Patient's LFT is normal  Patient has been on Celexa at home  Patient's anion gap is closed and patient has hyperglycemia secondary to uncontrolled diabetes mellitus  Patient continues to have normal anion gap metabolic acidosis  Plasma-Lyte to continue for now  Sodium phosphate and magnesium replacements  Patient may not require any insulin infusion at this time on the base of the clinical data available and patient's clinical evaluations  Bronchodilators to continue  No need for any systemic steroids for now  Patient's BMP to be followed  Monitor input output and BMP  Correct electrolytes and whenever necessary basis  Smoking cessation advised  Nicotine replacement therapy if the patient wants  Blood glucose monitoring with sliding scale insulin  May need long-acting insulin  IM to decide upon continuation of Creon and statins  PUD and DVT prophylaxis      Case discussed with patient, IM and ICU team    Further management depending on patient clinical status and the bronchoscopy findings and results            Electronically signed by:  Ismael Lobo MD    12/21/2022    11:56 AM.

## 2022-12-21 NOTE — PLAN OF CARE
Problem: Discharge Planning  Goal: Discharge to home or other facility with appropriate resources  12/21/2022 1831 by Babar Rand RN  Outcome: Progressing  12/21/2022 1039 by Bal Foy RN  Outcome: Progressing     Problem: Pain  Goal: Verbalizes/displays adequate comfort level or baseline comfort level  12/21/2022 1831 by Babar Rand RN  Outcome: Progressing  12/21/2022 1039 by Bal Foy RN  Outcome: Progressing     Problem: Respiratory - Adult  Goal: Achieves optimal ventilation and oxygenation  12/21/2022 1831 by Babar Rand RN  Outcome: Progressing  12/21/2022 1039 by Bal Foy RN  Outcome: Progressing     Problem: Metabolic/Fluid and Electrolytes - Adult  Goal: Electrolytes maintained within normal limits  12/21/2022 1831 by Babar Rand RN  Outcome: Progressing  12/21/2022 1039 by Bal Foy RN  Outcome: Progressing  Goal: Glucose maintained within prescribed range  12/21/2022 1831 by Babar Rand RN  Outcome: Progressing  12/21/2022 1039 by Bal Foy RN  Outcome: Progressing     Problem: Chronic Conditions and Co-morbidities  Goal: Patient's chronic conditions and co-morbidity symptoms are monitored and maintained or improved  12/21/2022 1831 by Babar Rand RN  Outcome: Progressing  12/21/2022 1039 by Bal Foy RN  Outcome: Progressing

## 2022-12-21 NOTE — PROGRESS NOTES
Shift: [id number]    Admitting diagnosis: Pneumonia    Presentation to hospital: Persistent cough, hyponatremic     Surgery: no     Nursing assessment at handoff  stable    Emergency Contact/POA:Gayle (sister)  Family updated: no- per patient request    Most recent vitals: BP (!) 114/43   Pulse 82   Temp 98.1 °F (36.7 °C) (Axillary)   Resp 27   Ht 6' (1.829 m)   Wt 190 lb (86.2 kg)   SpO2 96%   BMI 25.77 kg/m²      Rhythm: Normal Sinus Rhythm      NC/HFNC- RA    Respiratory support: - No ventilator support    Vent days: Day n/a    Increased O2 requirements: no    Admission weight Weight: 190 lb (86.2 kg)  Today's weight   Wt Readings from Last 1 Encounters:   12/20/22 190 lb (86.2 kg)         UOP >30ml/hr: yes      Coleman need assessed each shift: n/a    Restraints: no  Order current and documentation up to date? Lines/Drains  LDA Insertion Date Discontinued Date Dressing Changes   PIV  12/20/22     TLC       Arterial       Coleman       Vas Cath      ETT       Surgical drains        Night Shift Hospitalist Interventions    Problem(Brief) Date Time Intervention Physician contacted                                               Drip rates at handoff:    electrolyte-A 125 mL/hr at 12/21/22 [place] Course Daily Updates:  Admit Day# 0  Antibiotics initiated (rocephin and azithromycin)  Tessalon perles have helped to decrease coughing  Denies dyspnea    Lab Data:   CBC:   Recent Labs     12/20/22  1314   WBC 20.9*   HGB 11.7*   HCT 33.3*   MCV 93.2        BMP:    Recent Labs     12/20/22  1314 12/20/22  1517   * 120*   K 3.9 3.9   CO2 15* 16*   BUN 5* 4*   CREATININE 0.8* 0.7*     LIVR:   Recent Labs     12/20/22  1314   AST 21   ALT 14     PT/INR:   Recent Labs     12/20/22  1314   PROT 7.0     APTT: No results for input(s): APTT in the last 72 hours. ABG: No results for input(s): PHART, TUD0RWS, PO2ART in the last 72 hours.   Consults (if GI or Nephrology- which group?)-  n/a

## 2022-12-21 NOTE — PROGRESS NOTES
Transported patient to inpatient room with hospital transport staff  Per gurney with side rails up x2  VSS  Resp improved from previous assessment: lungs clear bilaterally with infrequent cough     Transported in no distress

## 2022-12-21 NOTE — PROGRESS NOTES
Alert and oriented - states has been coughing like \"this\" for two weeks-  VSS      Sputum - clear thin/  CXR obtained waiting for it to be read

## 2022-12-21 NOTE — ANESTHESIA POSTPROCEDURE EVALUATION
Department of Anesthesiology  Postprocedure Note    Patient: Cipriano Allen  MRN: [id number]  YOB: 1965  Date of evaluation: 12/21/2022      Procedure Summary     Date: 12/21/22 Room / Location: [address] [place] [address]    Anesthesia Start: 5442 Anesthesia Stop: 1417    Procedures:       [facility] with fluorscopy      BRONCHOSCOPY/TRANSBRONCHIAL LUNG BIOPSY      BRONCHOSCOPY/TRANSBRONCHIAL LUNG BIOPSY ADDL LOBE      BRONCHOSCOPY BRUSHINGS Diagnosis:       Lung nodule      (lung nodule)    Surgeons: Gino Coulter MD Responsible Provider: Carmen Arroyo MD    Anesthesia Type: general ASA Status: 4          Anesthesia Type: No value filed.     Quin Phase I: Quin Score: 8    Quin Phase II: Quin Score: 9      Anesthesia Post Evaluation    Patient location during evaluation: PACU  Patient participation: complete - patient participated  Level of consciousness: awake  Pain score: 0  Airway patency: patent  Nausea & Vomiting: no nausea  Complications: no  Cardiovascular status: blood pressure returned to baseline  Respiratory status: acceptable  Hydration status: euvolemic

## 2022-12-21 NOTE — PROGRESS NOTES
Report called to inpt ALLYSSA Tyler)    DR yeung called - ok for pt to return to C4  before xray resulted

## 2022-12-21 NOTE — PROGRESS NOTES
Patient transferred out of ICU to C4 room 424 @ 10:30am. C4 RN came to bedside. Patient went via wheelchair with telemetry monitoring, escorted by RN.     Citlali Gonzalez RN

## 2022-12-22 LAB
A/G RATIO: 0.8 (ref 1.1–2.2)
AFB SMEAR: NORMAL
ALBUMIN SERPL-MCNC: 2.7 G/DL (ref 3.4–5)
ALP BLD-CCNC: 90 U/L (ref 40–129)
ALT SERPL-CCNC: 14 U/L (ref 10–40)
ANION GAP SERPL CALCULATED.3IONS-SCNC: 15 MMOL/L (ref 3–16)
ANION GAP SERPL CALCULATED.3IONS-SCNC: 15 MMOL/L (ref 3–16)
ANION GAP SERPL CALCULATED.3IONS-SCNC: 16 MMOL/L (ref 3–16)
AST SERPL-CCNC: 11 U/L (ref 15–37)
BANDED NEUTROPHILS RELATIVE PERCENT: 14 % (ref 0–7)
BASOPHILS ABSOLUTE: 0 K/UL (ref 0–0.2)
BASOPHILS RELATIVE PERCENT: 0 %
BETA-HYDROXYBUTYRATE: 2.59 MMOL/L (ref 0–0.27)
BETA-HYDROXYBUTYRATE: 3.43 MMOL/L (ref 0–0.27)
BETA-HYDROXYBUTYRATE: 4.02 MMOL/L (ref 0–0.27)
BILIRUB SERPL-MCNC: <0.2 MG/DL (ref 0–1)
BUN BLDV-MCNC: 6 MG/DL (ref 7–20)
BUN BLDV-MCNC: 6 MG/DL (ref 7–20)
BUN BLDV-MCNC: 8 MG/DL (ref 7–20)
CALCIUM SERPL-MCNC: 8.2 MG/DL (ref 8.3–10.6)
CALCIUM SERPL-MCNC: 8.2 MG/DL (ref 8.3–10.6)
CALCIUM SERPL-MCNC: 8.4 MG/DL (ref 8.3–10.6)
CHLORIDE BLD-SCNC: 95 MMOL/L (ref 99–110)
CHLORIDE BLD-SCNC: 96 MMOL/L (ref 99–110)
CHLORIDE BLD-SCNC: 96 MMOL/L (ref 99–110)
CO2: 16 MMOL/L (ref 21–32)
CO2: 18 MMOL/L (ref 21–32)
CO2: 18 MMOL/L (ref 21–32)
CREAT SERPL-MCNC: 0.6 MG/DL (ref 0.9–1.3)
CREAT SERPL-MCNC: 0.6 MG/DL (ref 0.9–1.3)
CREAT SERPL-MCNC: <0.5 MG/DL (ref 0.9–1.3)
EOSINOPHILS ABSOLUTE: 0 K/UL (ref 0–0.6)
EOSINOPHILS RELATIVE PERCENT: 0 %
ESTIMATED AVERAGE GLUCOSE: 217.3 MG/DL
FUNGUS STAIN: NORMAL
GFR SERPL CREATININE-BSD FRML MDRD: >60 ML/MIN/{1.73_M2}
GLUCOSE BLD-MCNC: 228 MG/DL (ref 70–99)
GLUCOSE BLD-MCNC: 246 MG/DL (ref 70–99)
GLUCOSE BLD-MCNC: 260 MG/DL (ref 70–99)
GLUCOSE BLD-MCNC: 264 MG/DL (ref 70–99)
GLUCOSE BLD-MCNC: 312 MG/DL (ref 70–99)
GLUCOSE BLD-MCNC: 341 MG/DL (ref 70–99)
GLUCOSE BLD-MCNC: 389 MG/DL (ref 70–99)
HBA1C MFR BLD: 9.2 %
HCT VFR BLD CALC: 30.8 % (ref 40.5–52.5)
HEMOGLOBIN: 10.7 G/DL (ref 13.5–17.5)
INR BLD: 1.19 (ref 0.87–1.14)
LYMPHOCYTES ABSOLUTE: 0.7 K/UL (ref 1–5.1)
LYMPHOCYTES RELATIVE PERCENT: 5 %
MCH RBC QN AUTO: 32.6 PG (ref 26–34)
MCHC RBC AUTO-ENTMCNC: 34.8 G/DL (ref 31–36)
MCV RBC AUTO: 93.8 FL (ref 80–100)
METAMYELOCYTES RELATIVE PERCENT: 2 %
MONOCYTES ABSOLUTE: 0.9 K/UL (ref 0–1.3)
MONOCYTES RELATIVE PERCENT: 7 %
MYELOCYTE PERCENT: 3 %
NEUTROPHILS ABSOLUTE: 11.8 K/UL (ref 1.7–7.7)
NEUTROPHILS RELATIVE PERCENT: 69 %
PDW BLD-RTO: 12.7 % (ref 12.4–15.4)
PERFORMED ON: ABNORMAL
PHOSPHORUS: 2.9 MG/DL (ref 2.5–4.9)
PHOSPHORUS: 3 MG/DL (ref 2.5–4.9)
PHOSPHORUS: 3.2 MG/DL (ref 2.5–4.9)
PLATELET # BLD: 461 K/UL (ref 135–450)
PLATELET SLIDE REVIEW: ABNORMAL
PMV BLD AUTO: 6.5 FL (ref 5–10.5)
POTASSIUM REFLEX MAGNESIUM: 5.1 MMOL/L (ref 3.5–5.1)
POTASSIUM SERPL-SCNC: 4.6 MMOL/L (ref 3.5–5.1)
POTASSIUM SERPL-SCNC: 4.6 MMOL/L (ref 3.5–5.1)
PROTHROMBIN TIME: 15.1 SEC (ref 11.7–14.5)
RBC # BLD: 3.28 M/UL (ref 4.2–5.9)
RBC # BLD: NORMAL 10*6/UL
SLIDE REVIEW: ABNORMAL
SODIUM BLD-SCNC: 128 MMOL/L (ref 136–145)
SODIUM BLD-SCNC: 128 MMOL/L (ref 136–145)
SODIUM BLD-SCNC: 129 MMOL/L (ref 136–145)
TOTAL PROTEIN: 6.1 G/DL (ref 6.4–8.2)
TROPONIN: <0.01 NG/ML
WBC # BLD: 13.4 K/UL (ref 4–11)

## 2022-12-22 PROCEDURE — 6370000000 HC RX 637 (ALT 250 FOR IP): Performed by: INTERNAL MEDICINE

## 2022-12-22 PROCEDURE — 84484 ASSAY OF TROPONIN QUANT: CPT

## 2022-12-22 PROCEDURE — C9113 INJ PANTOPRAZOLE SODIUM, VIA: HCPCS | Performed by: INTERNAL MEDICINE

## 2022-12-22 PROCEDURE — 84100 ASSAY OF PHOSPHORUS: CPT

## 2022-12-22 PROCEDURE — 6360000002 HC RX W HCPCS: Performed by: HOSPITALIST

## 2022-12-22 PROCEDURE — 36415 COLL VENOUS BLD VENIPUNCTURE: CPT

## 2022-12-22 PROCEDURE — 2580000003 HC RX 258: Performed by: INTERNAL MEDICINE

## 2022-12-22 PROCEDURE — 99233 SBSQ HOSP IP/OBS HIGH 50: CPT | Performed by: INTERNAL MEDICINE

## 2022-12-22 PROCEDURE — 85610 PROTHROMBIN TIME: CPT

## 2022-12-22 PROCEDURE — 82010 KETONE BODYS QUAN: CPT

## 2022-12-22 PROCEDURE — 80053 COMPREHEN METABOLIC PANEL: CPT

## 2022-12-22 PROCEDURE — 85025 COMPLETE CBC W/AUTO DIFF WBC: CPT

## 2022-12-22 PROCEDURE — 2060000000 HC ICU INTERMEDIATE R&B

## 2022-12-22 PROCEDURE — 6360000002 HC RX W HCPCS: Performed by: INTERNAL MEDICINE

## 2022-12-22 PROCEDURE — 2500000003 HC RX 250 WO HCPCS: Performed by: INTERNAL MEDICINE

## 2022-12-22 PROCEDURE — 83036 HEMOGLOBIN GLYCOSYLATED A1C: CPT

## 2022-12-22 PROCEDURE — 6370000000 HC RX 637 (ALT 250 FOR IP): Performed by: HOSPITALIST

## 2022-12-22 RX ORDER — DEXTROSE MONOHYDRATE 100 MG/ML
INJECTION, SOLUTION INTRAVENOUS CONTINUOUS PRN
Status: DISCONTINUED | OUTPATIENT
Start: 2022-12-22 | End: 2022-12-24 | Stop reason: HOSPADM

## 2022-12-22 RX ORDER — M-VIT,TX,IRON,MINS/CALC/FOLIC 27MG-0.4MG
1 TABLET ORAL DAILY
Status: DISCONTINUED | OUTPATIENT
Start: 2022-12-22 | End: 2022-12-24 | Stop reason: HOSPADM

## 2022-12-22 RX ORDER — IPRATROPIUM BROMIDE AND ALBUTEROL SULFATE 2.5; .5 MG/3ML; MG/3ML
1 SOLUTION RESPIRATORY (INHALATION) EVERY 4 HOURS PRN
Status: DISCONTINUED | OUTPATIENT
Start: 2022-12-22 | End: 2022-12-22 | Stop reason: SDUPTHER

## 2022-12-22 RX ORDER — MAGNESIUM SULFATE 1 G/100ML
1000 INJECTION INTRAVENOUS PRN
Status: DISCONTINUED | OUTPATIENT
Start: 2022-12-22 | End: 2022-12-22 | Stop reason: CLARIF

## 2022-12-22 RX ORDER — DEXTROSE, SODIUM CHLORIDE, AND POTASSIUM CHLORIDE 5; .45; .15 G/100ML; G/100ML; G/100ML
INJECTION INTRAVENOUS CONTINUOUS PRN
Status: DISCONTINUED | OUTPATIENT
Start: 2022-12-22 | End: 2022-12-22 | Stop reason: CLARIF

## 2022-12-22 RX ORDER — ACETAMINOPHEN 325 MG/1
650 TABLET ORAL EVERY 6 HOURS PRN
Status: DISCONTINUED | OUTPATIENT
Start: 2022-12-22 | End: 2022-12-22 | Stop reason: SDUPTHER

## 2022-12-22 RX ORDER — CEFUROXIME AXETIL 500 MG/1
500 TABLET ORAL 2 TIMES DAILY
Qty: 10 TABLET | Refills: 0 | OUTPATIENT
Start: 2022-12-22 | End: 2022-12-27

## 2022-12-22 RX ORDER — POLYETHYLENE GLYCOL 3350 17 G/17G
17 POWDER, FOR SOLUTION ORAL DAILY PRN
Status: DISCONTINUED | OUTPATIENT
Start: 2022-12-22 | End: 2022-12-24 | Stop reason: HOSPADM

## 2022-12-22 RX ORDER — BENZONATATE 100 MG/1
100 CAPSULE ORAL 3 TIMES DAILY PRN
Qty: 21 CAPSULE | Refills: 0 | OUTPATIENT
Start: 2022-12-22 | End: 2022-12-29

## 2022-12-22 RX ORDER — CITALOPRAM 20 MG/1
40 TABLET ORAL DAILY
Status: DISCONTINUED | OUTPATIENT
Start: 2022-12-22 | End: 2022-12-24 | Stop reason: HOSPADM

## 2022-12-22 RX ORDER — SODIUM CHLORIDE 450 MG/100ML
INJECTION, SOLUTION INTRAVENOUS CONTINUOUS
Status: DISCONTINUED | OUTPATIENT
Start: 2022-12-22 | End: 2022-12-22 | Stop reason: CLARIF

## 2022-12-22 RX ORDER — LACTOBACILLUS RHAMNOSUS GG 10B CELL
1 CAPSULE ORAL 2 TIMES DAILY WITH MEALS
Status: DISCONTINUED | OUTPATIENT
Start: 2022-12-22 | End: 2022-12-24 | Stop reason: HOSPADM

## 2022-12-22 RX ORDER — ACETAMINOPHEN 650 MG/1
650 SUPPOSITORY RECTAL EVERY 6 HOURS PRN
Status: DISCONTINUED | OUTPATIENT
Start: 2022-12-22 | End: 2022-12-22 | Stop reason: SDUPTHER

## 2022-12-22 RX ORDER — POTASSIUM CHLORIDE 7.45 MG/ML
10 INJECTION INTRAVENOUS PRN
Status: DISCONTINUED | OUTPATIENT
Start: 2022-12-22 | End: 2022-12-22 | Stop reason: CLARIF

## 2022-12-22 RX ORDER — LISINOPRIL 5 MG/1
5 TABLET ORAL DAILY
Status: DISCONTINUED | OUTPATIENT
Start: 2022-12-22 | End: 2022-12-24 | Stop reason: HOSPADM

## 2022-12-22 RX ORDER — SODIUM CHLORIDE 0.9 % (FLUSH) 0.9 %
5-40 SYRINGE (ML) INJECTION PRN
Status: DISCONTINUED | OUTPATIENT
Start: 2022-12-22 | End: 2022-12-22 | Stop reason: CLARIF

## 2022-12-22 RX ORDER — INSULIN DEGLUDEC 100 U/ML
12 INJECTION, SOLUTION SUBCUTANEOUS NIGHTLY
Status: DISCONTINUED | OUTPATIENT
Start: 2022-12-22 | End: 2022-12-24 | Stop reason: HOSPADM

## 2022-12-22 RX ORDER — TRAZODONE HYDROCHLORIDE 50 MG/1
100 TABLET ORAL NIGHTLY
Status: DISCONTINUED | OUTPATIENT
Start: 2022-12-22 | End: 2022-12-24 | Stop reason: HOSPADM

## 2022-12-22 RX ORDER — SODIUM CHLORIDE 9 MG/ML
INJECTION, SOLUTION INTRAVENOUS PRN
Status: DISCONTINUED | OUTPATIENT
Start: 2022-12-22 | End: 2022-12-22 | Stop reason: CLARIF

## 2022-12-22 RX ORDER — SODIUM CHLORIDE 0.9 % (FLUSH) 0.9 %
5-40 SYRINGE (ML) INJECTION EVERY 12 HOURS SCHEDULED
Status: DISCONTINUED | OUTPATIENT
Start: 2022-12-22 | End: 2022-12-22 | Stop reason: CLARIF

## 2022-12-22 RX ORDER — ATORVASTATIN CALCIUM 10 MG/1
10 TABLET, FILM COATED ORAL DAILY
Status: DISCONTINUED | OUTPATIENT
Start: 2022-12-22 | End: 2022-12-24 | Stop reason: HOSPADM

## 2022-12-22 RX ORDER — INSULIN LISPRO 100 [IU]/ML
0-4 INJECTION, SOLUTION INTRAVENOUS; SUBCUTANEOUS
Status: DISCONTINUED | OUTPATIENT
Start: 2022-12-22 | End: 2022-12-23

## 2022-12-22 RX ORDER — ONDANSETRON 4 MG/1
4 TABLET, ORALLY DISINTEGRATING ORAL EVERY 8 HOURS PRN
Status: DISCONTINUED | OUTPATIENT
Start: 2022-12-22 | End: 2022-12-24 | Stop reason: HOSPADM

## 2022-12-22 RX ORDER — INSULIN LISPRO 100 [IU]/ML
4 INJECTION, SOLUTION INTRAVENOUS; SUBCUTANEOUS
Status: DISCONTINUED | OUTPATIENT
Start: 2022-12-22 | End: 2022-12-22

## 2022-12-22 RX ORDER — INSULIN LISPRO 100 [IU]/ML
8 INJECTION, SOLUTION INTRAVENOUS; SUBCUTANEOUS
Status: DISCONTINUED | OUTPATIENT
Start: 2022-12-22 | End: 2022-12-24 | Stop reason: HOSPADM

## 2022-12-22 RX ORDER — NICOTINE 21 MG/24HR
1 PATCH, TRANSDERMAL 24 HOURS TRANSDERMAL DAILY
Status: DISCONTINUED | OUTPATIENT
Start: 2022-12-22 | End: 2022-12-22 | Stop reason: SDUPTHER

## 2022-12-22 RX ORDER — GUAIFENESIN/DEXTROMETHORPHAN 100-10MG/5
10 SYRUP ORAL EVERY 4 HOURS PRN
Status: DISCONTINUED | OUTPATIENT
Start: 2022-12-22 | End: 2022-12-24 | Stop reason: HOSPADM

## 2022-12-22 RX ORDER — ONDANSETRON 2 MG/ML
4 INJECTION INTRAMUSCULAR; INTRAVENOUS EVERY 6 HOURS PRN
Status: DISCONTINUED | OUTPATIENT
Start: 2022-12-22 | End: 2022-12-24 | Stop reason: HOSPADM

## 2022-12-22 RX ORDER — ENOXAPARIN SODIUM 100 MG/ML
40 INJECTION SUBCUTANEOUS DAILY
Status: DISCONTINUED | OUTPATIENT
Start: 2022-12-22 | End: 2022-12-22 | Stop reason: SDUPTHER

## 2022-12-22 RX ORDER — IPRATROPIUM BROMIDE AND ALBUTEROL SULFATE 2.5; .5 MG/3ML; MG/3ML
1 SOLUTION RESPIRATORY (INHALATION)
Status: DISCONTINUED | OUTPATIENT
Start: 2022-12-22 | End: 2022-12-22

## 2022-12-22 RX ORDER — INSULIN LISPRO 100 [IU]/ML
0-4 INJECTION, SOLUTION INTRAVENOUS; SUBCUTANEOUS NIGHTLY
Status: DISCONTINUED | OUTPATIENT
Start: 2022-12-22 | End: 2022-12-24 | Stop reason: HOSPADM

## 2022-12-22 RX ADMIN — SODIUM BICARBONATE: 84 INJECTION, SOLUTION INTRAVENOUS at 18:56

## 2022-12-22 RX ADMIN — SODIUM CHLORIDE, SODIUM GLUCONATE, SODIUM ACETATE, POTASSIUM CHLORIDE AND MAGNESIUM CHLORIDE: 526; 502; 368; 37; 30 INJECTION, SOLUTION INTRAVENOUS at 08:33

## 2022-12-22 RX ADMIN — BENZONATATE 100 MG: 100 CAPSULE ORAL at 22:01

## 2022-12-22 RX ADMIN — CEFTRIAXONE SODIUM 1000 MG: 1 INJECTION, POWDER, FOR SOLUTION INTRAMUSCULAR; INTRAVENOUS at 15:44

## 2022-12-22 RX ADMIN — Medication 1 TABLET: at 09:15

## 2022-12-22 RX ADMIN — INSULIN LISPRO 12 UNITS: 100 INJECTION, SOLUTION INTRAVENOUS; SUBCUTANEOUS at 20:14

## 2022-12-22 RX ADMIN — PANTOPRAZOLE SODIUM 40 MG: 40 INJECTION, POWDER, FOR SOLUTION INTRAVENOUS at 09:15

## 2022-12-22 RX ADMIN — SODIUM CHLORIDE, SODIUM GLUCONATE, SODIUM ACETATE, POTASSIUM CHLORIDE AND MAGNESIUM CHLORIDE: 526; 502; 368; 37; 30 INJECTION, SOLUTION INTRAVENOUS at 00:29

## 2022-12-22 RX ADMIN — AZITHROMYCIN MONOHYDRATE 500 MG: 500 INJECTION, POWDER, LYOPHILIZED, FOR SOLUTION INTRAVENOUS at 16:47

## 2022-12-22 RX ADMIN — INSULIN LISPRO 4 UNITS: 100 INJECTION, SOLUTION INTRAVENOUS; SUBCUTANEOUS at 09:22

## 2022-12-22 RX ADMIN — GUAIFENESIN AND DEXTROMETHORPHAN 10 ML: 100; 10 SYRUP ORAL at 22:01

## 2022-12-22 RX ADMIN — CITALOPRAM HYDROBROMIDE 40 MG: 20 TABLET ORAL at 09:15

## 2022-12-22 RX ADMIN — SODIUM BICARBONATE 50 MEQ: 84 INJECTION, SOLUTION INTRAVENOUS at 15:39

## 2022-12-22 RX ADMIN — SODIUM CHLORIDE, PRESERVATIVE FREE 10 ML: 5 INJECTION INTRAVENOUS at 09:16

## 2022-12-22 RX ADMIN — ATORVASTATIN CALCIUM 10 MG: 10 TABLET, FILM COATED ORAL at 09:15

## 2022-12-22 RX ADMIN — ENOXAPARIN SODIUM 40 MG: 100 INJECTION SUBCUTANEOUS at 09:16

## 2022-12-22 RX ADMIN — INSULIN DEGLUDEC 12 UNITS: 100 INJECTION, SOLUTION SUBCUTANEOUS at 20:14

## 2022-12-22 RX ADMIN — INSULIN LISPRO 8 UNITS: 100 INJECTION, SOLUTION INTRAVENOUS; SUBCUTANEOUS at 15:38

## 2022-12-22 RX ADMIN — Medication 1 CAPSULE: at 12:50

## 2022-12-22 RX ADMIN — TRAZODONE HYDROCHLORIDE 100 MG: 50 TABLET ORAL at 20:20

## 2022-12-22 RX ADMIN — LISINOPRIL 5 MG: 5 TABLET ORAL at 09:15

## 2022-12-22 RX ADMIN — ACETAMINOPHEN 650 MG: 325 TABLET ORAL at 08:31

## 2022-12-22 RX ADMIN — Medication 1 CAPSULE: at 16:48

## 2022-12-22 RX ADMIN — BENZONATATE 100 MG: 100 CAPSULE ORAL at 08:31

## 2022-12-22 RX ADMIN — ONDANSETRON 4 MG: 2 INJECTION INTRAMUSCULAR; INTRAVENOUS at 08:26

## 2022-12-22 ASSESSMENT — PAIN DESCRIPTION - DESCRIPTORS: DESCRIPTORS: ACHING

## 2022-12-22 ASSESSMENT — PAIN SCALES - GENERAL
PAINLEVEL_OUTOF10: 0
PAINLEVEL_OUTOF10: 0
PAINLEVEL_OUTOF10: 7
PAINLEVEL_OUTOF10: 0

## 2022-12-22 ASSESSMENT — PAIN DESCRIPTION - LOCATION: LOCATION: HEAD

## 2022-12-22 NOTE — PROCEDURES
Bronchoscopy note    Patient with pulm infiltrates, mediastinal adenopathy along with that patient has history of smoking and excessive weight loss and given the patient's clinical status and radiology data it was decided to do a bronchoscopy for diagnostic purposes and for that reason after informed consent, patient was taken to the endoscopy suite, patient was given anesthesia by the anesthesiologist, please refer to anesthesia sheet for ASA and Mallampati scores, the bronchoscope was introduced to the endotracheal tube using a T-piece adapter, patient was found to have thick mucous plugs which were quite viscous and tenacious in the right upper lobe bronchus, patient did not have any endobronchial lesion, patient also had some mucous plugs in the right lower lobe and left upper lobe bronchus, the bronchoscope was wedged into the right upper lobe bronchus and BAL was done from the area subsequently bronchial brushings and transbronchial biopsy under fluoroscopy was done from right upper lobe, the total fluoroscopy time used was 1 minute, subsequently endobronchial ultrasound was introduced and subcarinal lymph node and paratracheal lymph nodes were identified and multiple needle biopsies were done from the area, patient tolerated the procedure well and did not have any apparent complications  Patient's BAL being sent for various cultures and cytology endobronchial brushings and transbronchial biopsies along with needle biopsy sent for cytology and histopathology,  Estimated blood loss was 0  X-ray chest has been ordered to rule out any iatrogenic pneumothorax  Further management depending on patient clinical status and the bronchoscopy results    Gera Smith MD

## 2022-12-22 NOTE — PROGRESS NOTES
Hospitalist ICU Progress Note    CC: Multifocal pneumonia    Hospital course:  61 y.o. male who presented to the ED directly from his PCPs office after routine blood work revealed acute on chronic severe hyponatremia (118) and left shifted leukocytosis. Upon further questioning patient reports that he has been ill for greater than 1 weeks duration with fever, chills, runny nose and cough. He is unaware of any recent sick exposures, but is being evaluated during an influenza A outbreak. Patient has a complex medical history which includes pancreatic endocrine and exocrine insufficiency related to past EtOH abuse (currently in sustained remission x15 years). As a result, he is an insulin-dependent diabetic he reports that he was compliant with his regimen in most circumstances. Patient does continue to smoke tobacco.     Upon arrival to the ED patient was tachycardic, tachypneic, and with soft /59. Stat EKG demonstrates NSR without evidence of ischemia. CXR notable for right-sided multifocal pneumonia, as well as JACE pulmonary nodule. Influenza A/B and SARS-CoV-2 testing performed and noted to be negative. Labs obtained and notable for increased anion gap metabolic acidosis, acute hyperglycemia, large ketonuria consistent with DKA. Other lab abnormalities include: Acute on chronic hyponatremia 118, left shifted leukocytosis 20.9, and anemia with H/H 11.7/33. 3. Patient initiated on 2 L IV fluid bolus per ED attending prior to admit request.  He also received a dosage of IV Rocephin and Zithromax following collection of blood cultures. Ultimately patient will require ICU admission to assist with DKA, severe hyponatremia, and multifocal pneumonia. Pt did not require insulin drip. Glucose was improving. Bronch did not appreciate any specific mass, biopsy was obtained via EBUS. So far negative.   Pt breathing better today    Admit date: 12/20/2022  Days in hospital:  2    24 Hour Events: coughing episodes much improved    Subjective: bronch removed significant mucous plugging, likely why pt is better - glucose is not controlled - pt would like more control with his regimen - he currently takes degludec 12 units nightly as well as lispro 8 units tid with SSI - sodium is now stable at 128 - will stop IVF    ROS:  A comprehensive review of systems was negative except for: cough with sputum, shortness of breath      Objective:    VITALS:  /74   Pulse (!) 109   Temp 98 °F (36.7 °C)   Resp 18   Ht 6' (1.829 m)   Wt 183 lb 4.8 oz (83.1 kg)   SpO2 96%   BMI 24.86 kg/m²     Gen: ill appearing  HEENT: NC/AT, moist mucous membranes, no oropharyngeal erythema or exudate    Neck: supple, trachea midline, no anterior cervical or SC LAD  Heart:  Normal s1/s2, RRR, no murmurs, gallops, or rubs. no leg edema  Lungs:  some rhonchi bilaterally, no wheeze, no rales, some bilat rhonchi, no crackles, no use of accessory muscles  Abd: bowel sounds present, soft, nontender, nondistended, no masses  Extrem:  No clubbing, cyanosis,  no edema, peripheral pulses 2+, brisk capillary refill  Skin: no rashes or lesions, normal color/perfusion  Psych:  A & O x3    Neuro: grossly intact, moves all four extremities. Radiology (personally reviewed by me):  CXR 12/21:    Impression:        No significant improvement. Persistent multifocal infiltrates and left upper   lobe lung nodule.      No pneumothorax        Assessment:    Principal Problem:    Multifocal pneumonia  Active Problems:    Acute hyponatremia    Uncontrolled type 2 diabetes mellitus with hyperglycemia (HCC)    Tobacco abuse    Exocrine pancreatic insufficiency    Normal anion gap metabolic acidosis    Excessive weight loss    Leukocytosis    Engages in vaping    Mediastinal adenopathy    Hypophosphatemia    Alcohol use disorder, severe, in sustained remission ([facility] [address]  Resolved Problems:    * No resolved hospital problems. *      Plan:   Hyponatremia - acute on chronic  - did correct rapidly - monitor closely for symptoms - nephrology had been consulted, but I think pt is close to baseline when reviewing old records  Multifocal pneumonia - on ceftriaxone and azithromycin and lactoabcillus  Possible lung mass with lymphadenopathy - bronch neg for malignancy at this time  DM2 with uncontrolled with hyperglycemia - on SSI - he will manage his own glucose now and administer with pharmacy guidance - they will come and log in his meds        Prognosis:  Fair    Code status:  Full code    DVT prophylaxis: Lovenox  GI prophylaxis: Proton Pump Inhibitor  Antibiotic prophylaxis indicated:   yes - lactobacillus    Nasal decolonization:  Bactroban  Diet:  ADULT DIET;  Regular; 4 carb choices (60 gm/meal)    Disposition:  Home    Medications:  Scheduled Meds:   citalopram  40 mg Oral Daily    lisinopril  5 mg Oral Daily    atorvastatin  10 mg Oral Daily    therapeutic multivitamin-minerals  1 tablet Oral Daily    traZODone  100 mg Oral Nightly    lactobacillus  1 capsule Oral BID WC    sodium chloride flush  5-40 mL IntraVENous 2 times per day    cefTRIAXone (ROCEPHIN) IV  1,000 mg IntraVENous Q24H    azithromycin  500 mg IntraVENous Q24H    enoxaparin  40 mg SubCUTAneous Daily    nicotine  1 patch TransDERmal Daily       PRN Meds:  ondansetron **OR** ondansetron, polyethylene glycol, guaiFENesin-dextromethorphan, sodium chloride flush, benzonatate, ipratropium-albuterol, acetaminophen    IV:          Intake/Output Summary (Last 24 hours) at 12/22/2022 1255  Last data filed at 12/22/2022 0916  Gross per 24 hour   Intake 510 ml   Output --   Net 510 ml         Results:  CBC:   Recent Labs     12/20/22  1314 12/22/22  0358   WBC 20.9* 13.4*   HGB 11.7* 10.7*   HCT 33.3* 30.8*   MCV 93.2 93.8    461*       BMP:   Recent Labs     12/22/22  0358 12/22/22  0918 12/22/22  1135   * 128* 128*   K 5.1 4.6 4.6   CL 96* 96* 95*   CO2 18* 16* 18*   PHOS 3.0 3.2 2.9   BUN 6* 6* 8   CREATININE 0.6* <0.5* 0.6*       Mag: No results for input(s): MAG in the last 72 hours. LIVER PROFILE:   Recent Labs     12/20/22  1314 12/22/22  0358   AST 21 11*   ALT 14 14   BILITOT 0.4 <0.2   ALKPHOS 108 90       PT/INR:   Recent Labs     12/22/22  0358   PROTIME 15.1*   INR 1.19*     APTT: No results for input(s): APTT in the last 72 hours.   UA:  Recent Labs     12/20/22  1437   COLORU Yellow   PHUR 5.5   WBCUA 0-2   RBCUA 0-2   MUCUS Rare*   CLARITYU Clear   SPECGRAV 1.015   LEUKOCYTESUR Negative   UROBILINOGEN 0.2   BILIRUBINUR SMALL*   BLOODU TRACE-LYSED*   GLUCOSEU Negative         ABG: No results found for: PHART, PH, CZG1UQN, PCO2, PO2ART, PO2, RDB9SGV, HCO3, BEART, BE, THGBART, THB, OMO3DHN, T8EFBVHT, O2SAT    Lab Results   Component Value Date    CALCIUM 8.4 12/22/2022    PHOS 2.9 12/22/2022             Electronically signed by Buckley Duane, MD on 12/22/2022 at 12:55 PM

## 2022-12-22 NOTE — PROGRESS NOTES
INPATIENT PULMONARY CRITICAL CARE PROGRESS NOTE      Reason for visit     DKA    SUBJECTIVE: Patient underwent bronchoscopy with endobronchial ultrasound and needle l biopsy without any apparent complications, patient when seen this morning was feeling better, patient did not have any significant cough or expectoration, patient does not have any increasing shortness of breath or wheezing, patient does not have any hemoptysis, patient was on room air oxygen with saturation 96% when seen, patient was able back and hemodynamically maintained, patient's rhythm on the monitor was sinus ranging from normal sinus rhythm to sinus tachycardia, patient's blood sugars are not optimally controlled, patient wants to discuss with IM about his insulin regimen because it is different from his home regimen and patient wants to optimize, patient was alert and oriented, patient urine output has not been recorded in the epic for review no other pertinent review of system of concern       Physical Exam:  Blood pressure 120/70, pulse 87, temperature 97.5 °F (36.4 °C), temperature source Oral, resp. rate 18, height 6' (1.829 m), weight 183 lb 4.8 oz (83.1 kg), SpO2 96 %.'     Constitutional: No respiratory distress  HENT:  Oropharynx is clear and moist. No thyromegaly. Eyes:  Conjunctivae are normal. Pupils equal, round, and reactive to light. No scleral icterus. Neck: . No tracheal deviation present. No obvious thyroid mass. Cardiovascular: sinus tachycardia  normal heart sounds. No right ventricular heave. No lower extremity edema. Pulmonary/Chest: Minimal wheezes. Decreased bilateral rales. No significant chest congestion chest wall is not dull to percussion. No accessory muscle usage or stridor. Some tachypnea  Abdominal: Soft. Bowel sounds present. No distension or hernia. No tenderness. Musculoskeletal: No cyanosis. No clubbing. No obvious joint deformity. Lymphadenopathy: No cervical or supraclavicular adenopathy. Skin: Skin is warm and dry. No rash or nodules on the exposed extremities. Psychiatric: Normal mood and affect. Behavior is normal.  No anxiety. Neurologic: Alert, awake and oriented. PERRL. Speech fluent      Results:  CBC:   Recent Labs     12/20/22  1314 12/22/22  0358   WBC 20.9* 13.4*   HGB 11.7* 10.7*   HCT 33.3* 30.8*   MCV 93.2 93.8    461*       BMP:   Recent Labs     12/21/22  0842 12/22/22  0358 12/22/22  0918   * 129* 128*   K 4.1 5.1 4.6   CL 93* 96* 96*   CO2 21 18* 16*   PHOS 2.0* 3.0 3.2   BUN <2* 6* 6*   CREATININE <0.5* 0.6* <0.5*       LIVER PROFILE:   Recent Labs     12/20/22  1314 12/22/22  0358   AST 21 11*   ALT 14 14   BILITOT 0.4 <0.2   ALKPHOS 108 90         UA:  Recent Labs     12/20/22  1437   COLORU Yellow   PHUR 5.5   WBCUA 0-2   RBCUA 0-2   MUCUS Rare*   CLARITYU Clear   SPECGRAV 1.015   LEUKOCYTESUR Negative   UROBILINOGEN 0.2   BILIRUBINUR SMALL*   BLOODU TRACE-LYSED*   GLUCOSEU Negative         Imaging:  I have reviewed radiology images personally. CT CHEST WO CONTRAST   Final Result   Multifocal consolidative change seen throughout the lungs, greatest in the   right upper lobe, favored to be secondary to pneumonia. There is scattered   intervening septal thickening in the areas of consolidation on the right. Recommend follow-up to radiographic resolution to exclude underlying   neoplastic etiology in the areas of consolidation      Small mediastinal nodes are noted, likely reactive in the absence of a known   primary      RECOMMENDATIONS:            XR CHEST (2 VW)   Final Result   Multifocal pneumonia on the right      Small pulmonary nodule in left upper lobe, not clearly calcified. Consider   noncontrast chest CT for further characterization. RECOMMENDATION:              XR CHEST (2 VW)    Result Date: 12/20/2022  EXAMINATION: TWO XRAY VIEWS OF THE CHEST 12/20/2022 1:21 pm COMPARISON: None.  HISTORY: ORDERING SYSTEM PROVIDED HISTORY: tachy, febrile, r/o PNA TECHNOLOGIST PROVIDED HISTORY: Reason for exam:->tachy, febrile, r/o PNA Reason for Exam: tachy, febrile, r/o PNA, cough FINDINGS: Multifocal parenchymal opacities are seen throughout the right lung. .  There is underlying emphysema. There is a focal pulmonary nodule in the left upper lobe, not clearly calcified No significant pleural fluid. There is likely underlying emphysema. Multifocal pneumonia on the right Small pulmonary nodule in left upper lobe, not clearly calcified. Consider noncontrast chest CT for further characterization. CT CHEST WO CONTRAST    Result Date: 12/20/2022  EXAMINATION: CT OF THE CHEST WITHOUT CONTRAST 12/20/2022 4:21 pm TECHNIQUE: CT of the chest was performed without the administration of intravenous contrast. Multiplanar reformatted images are provided for review. Automated exposure control, iterative reconstruction, and/or weight based adjustment of the mA/kV was utilized to reduce the radiation dose to as low as reasonably achievable. COMPARISON: None. HISTORY: ORDERING SYSTEM PROVIDED HISTORY: Multifocal pn/hyponatremia/weight loss of 40 lbs/smoker TECHNOLOGIST PROVIDED HISTORY: Reason for exam:->Multifocal pn/hyponatremia/weight loss of 40 lbs/smoker Reason for Exam: pneumonia, cough x several weeks Relevant Medical/Surgical History: smoker x 40 yrs FINDINGS: Mediastinum: Thyroid gland is unremarkable. Atherosclerotic change seen in aorta. Trace pericardial fluid is seen. Small mediastinal and hilar nodes are noted. Index precarinal node measures 1.5 cm x 2.1 cm. Lungs/pleura: Respiratory motion artifact limits evaluation of fine pulmonary parenchymal change There is a focal nodule seen in the left upper lobe, which measures 9 mm. This contains areas of increased density internally, likely early calcification. Patchy nodularity is seen in the lingula. Scattered patchy nodularity is seen in the left lower lobe. .  No significant pleural fluid on the left.  On the right, patchy and confluent parenchymal opacity is seen in the right upper lobe. There is intervening septal thickening seen. Samanta Opal Patchy ground-glass opacity is seen in the right middle lobe. Patchy ground-glass opacity is seen in the right lower lobe. No significant pleural fluid on the right. Upper Abdomen: Right adrenal gland is normal.  Left adrenal gland is normal. Soft Tissues/Bones: Spurring is seen in the spine. Multifocal consolidative change seen throughout the lungs, greatest in the right upper lobe, favored to be secondary to pneumonia. There is scattered intervening septal thickening in the areas of consolidation on the right. Recommend follow-up to radiographic resolution to exclude underlying neoplastic etiology in the areas of consolidation Small mediastinal nodes are noted, likely reactive in the absence of a known primary           Assessment:  Principal Problem:    Multifocal pneumonia  Active Problems:    Acute hyponatremia    Uncontrolled type 2 diabetes mellitus with hyperglycemia (HCC)    Tobacco abuse    Exocrine pancreatic insufficiency    Normal anion gap metabolic acidosis    Excessive weight loss    Leukocytosis    Engages in vaping    Mediastinal adenopathy    Hypophosphatemia    Alcohol use disorder, severe, in sustained remission ([facility] [address]  Resolved Problems:    * No resolved hospital problems.  *          Plan:   Oxygen supplementation, if required, to keep saturation 90 and 94% only  Patient was on RA when seen   Pulmonary toilet  S/p Bronchoscopy with EBUS and the resulst are pending   IV Rocephin and Zithromax to be continued  Urine antigen for Legionella was negative  Patient's LFT is normal  Patient has been on Celexa at home  Patient's anion gap is closed and patient has hyperglycemia secondary to uncontrolled diabetes mellitus  Patient continues to have normal anion gap metabolic acidosis  Bicarb drip started   Bronchodilators to continue  No need for any systemic steroids for now  Patient's BMP to be followed  Monitor input output and BMP  Correct electrolytes and whenever necessary basis  Smoking cessation advised  Nicotine replacement therapy if the patient wants  Insulins as per IM -in discussion with the patient  BGM with SSI  Patient's blood sugars are not controlled at this time   IM to decide upon continuation of Creon and statins  PUD and DVT prophylaxis      Case discussed with patient and nursing  Case d/w IM         Electronically signed by:  Trevor Jay MD    12/22/2022    12:07 PM.

## 2022-12-22 NOTE — CONSULTS
[facility] Consult Note           Reason for Consult:  Hyponatremia  Requesting Physician:  Dr. Roxy Anna    Chief Complaint:  Abnormal labs  History Obtained From:  patient, electronic medical record    History of Present Ilness:    61year old pleasant male with pancreatic endocrine/exocrine insufficiency related to prior ETOH abuse, chronic hyponatremia admitted with abnormal labs. We have been asked to assist in further mgmt of his Hyponatremia. SNa 118 on 12/21, up to 128 with initial IVF's, prior running in high 120, low 130 range. Urine osmolality 197, urine sodium & chloride less than 20. TSH 3.0 from Oct 2022. AG of 19 on admission, last at 12, CO2 15-16 on admission. BS ~200-300 range. Found to have PNA, started on ceftriaxone, azithromycin. Intake adequate, no weakness, shortness of breath, tremors, or confusion. Past Medical History:        Diagnosis Date    Diabetes mellitus ([facility] [address]        Past Surgical History:    History reviewed. No pertinent surgical history. Home Medications:    No current facility-administered medications on file prior to encounter.      Current Outpatient Medications on File Prior to Encounter   Medication Sig Dispense Refill    citalopram (CELEXA) 40 MG tablet Take 40 mg by mouth daily      Insulin Degludec (TRESIBA FLEXTOUCH) 100 UNIT/ML SOPN Take 12 units daily      insulin lispro, 1 Unit Dial, (HUMALOG/ADMELOG) 100 UNIT/ML SOPN 4 units with breakfast, lunch and dinner plus scale for maXIMUM OF 30      lisinopril (PRINIVIL;ZESTRIL) 5 MG tablet Take 5 mg by mouth daily      lovastatin (MEVACOR) 20 MG tablet Take 20 mg by mouth nightly      metFORMIN (GLUCOPHAGE) 1000 MG tablet Take 1,000 mg by mouth 2 times daily (with meals)      traZODone (DESYREL) 100 MG tablet Take 100 mg by mouth nightly      Multiple Vitamins-Minerals (THERAPEUTIC MULTIVITAMIN-MINERALS) tablet Take 1 tablet by mouth daily         Allergies: Patient has no known allergies. Social History:    Social History     Socioeconomic History    Marital status: Single     Spouse name: Not on file    Number of children: Not on file    Years of education: Not on file    Highest education level: Not on file   Occupational History    Not on file   Tobacco Use    Smoking status: Some Days     Types: Cigarettes    Smokeless tobacco: Never   Substance and Sexual Activity    Alcohol use: Not Currently    Drug use: Never    Sexual activity: Not on file   Other Topics Concern    Not on file   Social History Narrative    Not on file     Social Determinants of Health     Financial Resource Strain: Not on file   Food Insecurity: Not on file   Transportation Needs: Not on file   Physical Activity: Not on file   Stress: Not on file   Social Connections: Not on file   Intimate Partner Violence: Not on file   Housing Stability: Not on file       Family History:   No history of kidney disease. Review of Systems:   Pertinent positives stated above in HPI. Remainder of 10 point review of systems were reviewed and were negative.     Physical exam:   Constitutional:  VITALS:  /70   Pulse 87   Temp 97.5 °F (36.4 °C) (Oral)   Resp 18   Ht 6' (1.829 m)   Wt 183 lb 4.8 oz (83.1 kg)   SpO2 96%   BMI 24.86 kg/m²   Gen: alert, awake, nad  Skin: no rash, turgor wnl  Heent:  eomi, mmm  Neck: no bruits or jvd noted, thyroid normal  Cardiovascular:  S1, S2 without m/r/g  Respiratory: CTA B without w/r/r; respiratory effort normal  Abdomen:  +bs, soft, nt, nd, no hepatosplenomegaly  Ext: no lower extremity edema  Psychiatric: mood and affect appropriate; judgement and insight intact  Musculoskeletal:  Rom, muscular strength intact; digits, nails normal    Data/  CBC:   Lab Results   Component Value Date/Time    WBC 13.4 12/22/2022 03:58 AM    RBC 3.28 12/22/2022 03:58 AM    HGB 10.7 12/22/2022 03:58 AM    HCT 30.8 12/22/2022 03:58 AM    MCV 93.8 12/22/2022 03:58 AM    MCH 32.6 12/22/2022 03:58 AM    MCHC 34.8 12/22/2022 03:58 AM    RDW 12.7 12/22/2022 03:58 AM     12/22/2022 03:58 AM    MPV 6.5 12/22/2022 03:58 AM     BMP:    Lab Results   Component Value Date/Time     12/22/2022 09:18 AM    K 4.6 12/22/2022 09:18 AM    K 5.1 12/22/2022 03:58 AM    CL 96 12/22/2022 09:18 AM    CO2 16 12/22/2022 09:18 AM    BUN 6 12/22/2022 09:18 AM    LABALBU 2.7 12/22/2022 03:58 AM    CREATININE <0.5 12/22/2022 09:18 AM    CALCIUM 8.2 12/22/2022 09:18 AM    GFRAA >60 09/21/2010 09:13 PM    LABGLOM >60 12/22/2022 09:18 AM    GLUCOSE 246 12/22/2022 09:18 AM         Assessment/    - Hyponatremia - acute on chronic, baseline high 120, low 130 range   Acute component improved with IVF's suggestive of pre-renal state   Chronic component suspected from DM issues    - Anion-gap metabolic acidosis with concern for early DKA    - Multi-focal PNA      Plan/    - Change IVF's to 1/2  ml/hour to slow correction rate of sodium  - Trend labs, bp's, & urine output      Thank you for the consultation. Please do not hesitate to call with questions. ____________________________________  Penelope Shaw MD  [facility]  [email or web address]  Office: [phone]

## 2022-12-22 NOTE — CARE COORDINATION
CASE MANAGEMENT INITIAL ASSESSMENT      Reviewed chart and completed assessment with patient:  Family present: none  Explained Case Management role/services. To patient    Primary contact information:see below    Health Care Decision Maker :   Primary Decision Maker: Gayle Chou - Brother/Sister - [phone]    Secondary Decision Maker: Kiko Weldon - Brother/Sister - [phone]          Can this person be reached and be able to respond quickly, such as within a few minutes or hours? Yes    Admit date/status:Inpatient 12/20/2022  Diagnosis:Hyponatremia/ pneumonia   Is this a Readmission?:  No      Insurance:[employer]   Precert required for SNF: No  BEING WAIVED CURRENTLY     3 night stay required: No    Living arrangements, Adls, care needs, prior to admission:Lives home alone and [address] at home: None  Services in the home and/or outpatient, prior to admission:none    Current [address]                                Medications:has coverage Prescription coverage? Yes Will pt require financial assistance with medications No     Transportation needs: family, sister will transport home         PT/OT recs:ambulatory    Hospital Exemption Notification (HEN):n/a    Barriers to discharge:none    Plan/comments: To return home. He wants his sister to be primary point of contact because his mother recently had a stroke and he does not want to add stress to his elderly parents. Should go home with no needs. Will follow. HX ETOH abuse but has been sober 15 years. Will follow.       ECOC on chart for MD signature

## 2022-12-23 LAB
ALBUMIN SERPL-MCNC: 2.9 G/DL (ref 3.4–5)
ANION GAP SERPL CALCULATED.3IONS-SCNC: 6 MMOL/L (ref 3–16)
ANION GAP SERPL CALCULATED.3IONS-SCNC: 7 MMOL/L (ref 3–16)
ATYPICAL LYMPHOCYTE RELATIVE PERCENT: 3 % (ref 0–6)
BANDED NEUTROPHILS RELATIVE PERCENT: 18 % (ref 0–7)
BASOPHILS ABSOLUTE: 0.1 K/UL (ref 0–0.2)
BASOPHILS RELATIVE PERCENT: 1 %
BUN BLDV-MCNC: 9 MG/DL (ref 7–20)
BUN BLDV-MCNC: 9 MG/DL (ref 7–20)
CALCIUM SERPL-MCNC: 8.2 MG/DL (ref 8.3–10.6)
CALCIUM SERPL-MCNC: 8.5 MG/DL (ref 8.3–10.6)
CHLORIDE BLD-SCNC: 93 MMOL/L (ref 99–110)
CHLORIDE BLD-SCNC: 95 MMOL/L (ref 99–110)
CO2: 30 MMOL/L (ref 21–32)
CO2: 31 MMOL/L (ref 21–32)
CREAT SERPL-MCNC: 0.6 MG/DL (ref 0.9–1.3)
CREAT SERPL-MCNC: 0.7 MG/DL (ref 0.9–1.3)
CULTURE, RESPIRATORY: NORMAL
EOSINOPHILS ABSOLUTE: 0.1 K/UL (ref 0–0.6)
EOSINOPHILS RELATIVE PERCENT: 1 %
GFR SERPL CREATININE-BSD FRML MDRD: >60 ML/MIN/{1.73_M2}
GFR SERPL CREATININE-BSD FRML MDRD: >60 ML/MIN/{1.73_M2}
GLUCOSE BLD-MCNC: 160 MG/DL (ref 70–99)
GLUCOSE BLD-MCNC: 200 MG/DL (ref 70–99)
GLUCOSE BLD-MCNC: 202 MG/DL (ref 70–99)
GLUCOSE BLD-MCNC: 213 MG/DL (ref 70–99)
GLUCOSE BLD-MCNC: 229 MG/DL (ref 70–99)
GLUCOSE BLD-MCNC: 241 MG/DL (ref 70–99)
GLUCOSE BLD-MCNC: 268 MG/DL (ref 70–99)
GRAM STAIN RESULT: NORMAL
HCT VFR BLD CALC: 27.3 % (ref 40.5–52.5)
HEMOGLOBIN: 9.7 G/DL (ref 13.5–17.5)
INR BLD: 1.07 (ref 0.87–1.14)
LYMPHOCYTES ABSOLUTE: 2.5 K/UL (ref 1–5.1)
LYMPHOCYTES RELATIVE PERCENT: 18 %
MCH RBC QN AUTO: 32.6 PG (ref 26–34)
MCHC RBC AUTO-ENTMCNC: 35.5 G/DL (ref 31–36)
MCV RBC AUTO: 91.7 FL (ref 80–100)
METAMYELOCYTES RELATIVE PERCENT: 3 %
MONOCYTES ABSOLUTE: 0.5 K/UL (ref 0–1.3)
MONOCYTES RELATIVE PERCENT: 4 %
MYELOCYTE PERCENT: 2 %
NEUTROPHILS ABSOLUTE: 8.8 K/UL (ref 1.7–7.7)
NEUTROPHILS RELATIVE PERCENT: 50 %
PDW BLD-RTO: 12.6 % (ref 12.4–15.4)
PERFORMED ON: ABNORMAL
PHOSPHORUS: 2.8 MG/DL (ref 2.5–4.9)
PLATELET # BLD: 450 K/UL (ref 135–450)
PMV BLD AUTO: 6.9 FL (ref 5–10.5)
POTASSIUM SERPL-SCNC: 3.8 MMOL/L (ref 3.5–5.1)
POTASSIUM SERPL-SCNC: 3.9 MMOL/L (ref 3.5–5.1)
PROTHROMBIN TIME: 13.8 SEC (ref 11.7–14.5)
RBC # BLD: 2.98 M/UL (ref 4.2–5.9)
RBC # BLD: NORMAL 10*6/UL
SODIUM BLD-SCNC: 130 MMOL/L (ref 136–145)
SODIUM BLD-SCNC: 132 MMOL/L (ref 136–145)
WBC # BLD: 12.1 K/UL (ref 4–11)

## 2022-12-23 PROCEDURE — 6370000000 HC RX 637 (ALT 250 FOR IP): Performed by: INTERNAL MEDICINE

## 2022-12-23 PROCEDURE — 2060000000 HC ICU INTERMEDIATE R&B

## 2022-12-23 PROCEDURE — 80069 RENAL FUNCTION PANEL: CPT

## 2022-12-23 PROCEDURE — 85025 COMPLETE CBC W/AUTO DIFF WBC: CPT

## 2022-12-23 PROCEDURE — 99232 SBSQ HOSP IP/OBS MODERATE 35: CPT | Performed by: INTERNAL MEDICINE

## 2022-12-23 PROCEDURE — 36415 COLL VENOUS BLD VENIPUNCTURE: CPT

## 2022-12-23 PROCEDURE — 6370000000 HC RX 637 (ALT 250 FOR IP): Performed by: HOSPITALIST

## 2022-12-23 PROCEDURE — 85610 PROTHROMBIN TIME: CPT

## 2022-12-23 PROCEDURE — 6360000002 HC RX W HCPCS: Performed by: INTERNAL MEDICINE

## 2022-12-23 PROCEDURE — 2580000003 HC RX 258: Performed by: INTERNAL MEDICINE

## 2022-12-23 RX ORDER — INSULIN LISPRO 100 [IU]/ML
0-4 INJECTION, SOLUTION INTRAVENOUS; SUBCUTANEOUS EVERY 4 HOURS
Status: DISCONTINUED | OUTPATIENT
Start: 2022-12-23 | End: 2022-12-24 | Stop reason: HOSPADM

## 2022-12-23 RX ADMIN — ACETAMINOPHEN 650 MG: 325 TABLET ORAL at 12:01

## 2022-12-23 RX ADMIN — Medication 1 CAPSULE: at 16:27

## 2022-12-23 RX ADMIN — BENZONATATE 100 MG: 100 CAPSULE ORAL at 20:25

## 2022-12-23 RX ADMIN — INSULIN LISPRO 8 UNITS: 100 INJECTION, SOLUTION INTRAVENOUS; SUBCUTANEOUS at 08:30

## 2022-12-23 RX ADMIN — CITALOPRAM HYDROBROMIDE 40 MG: 20 TABLET ORAL at 08:27

## 2022-12-23 RX ADMIN — ACETAMINOPHEN 650 MG: 325 TABLET ORAL at 02:39

## 2022-12-23 RX ADMIN — AZITHROMYCIN MONOHYDRATE 500 MG: 500 INJECTION, POWDER, LYOPHILIZED, FOR SOLUTION INTRAVENOUS at 15:25

## 2022-12-23 RX ADMIN — INSULIN LISPRO 8 UNITS: 100 INJECTION, SOLUTION INTRAVENOUS; SUBCUTANEOUS at 16:08

## 2022-12-23 RX ADMIN — ATORVASTATIN CALCIUM 10 MG: 10 TABLET, FILM COATED ORAL at 08:27

## 2022-12-23 RX ADMIN — CEFTRIAXONE SODIUM 1000 MG: 1 INJECTION, POWDER, FOR SOLUTION INTRAMUSCULAR; INTRAVENOUS at 17:10

## 2022-12-23 RX ADMIN — GUAIFENESIN AND DEXTROMETHORPHAN 10 ML: 100; 10 SYRUP ORAL at 13:26

## 2022-12-23 RX ADMIN — SODIUM CHLORIDE, PRESERVATIVE FREE 10 ML: 5 INJECTION INTRAVENOUS at 20:25

## 2022-12-23 RX ADMIN — ENOXAPARIN SODIUM 40 MG: 100 INJECTION SUBCUTANEOUS at 08:30

## 2022-12-23 RX ADMIN — INSULIN LISPRO 8 UNITS: 100 INJECTION, SOLUTION INTRAVENOUS; SUBCUTANEOUS at 12:05

## 2022-12-23 RX ADMIN — BENZONATATE 100 MG: 100 CAPSULE ORAL at 12:04

## 2022-12-23 RX ADMIN — TRAZODONE HYDROCHLORIDE 100 MG: 50 TABLET ORAL at 20:25

## 2022-12-23 RX ADMIN — LISINOPRIL 5 MG: 5 TABLET ORAL at 08:27

## 2022-12-23 RX ADMIN — INSULIN DEGLUDEC 12 UNITS: 100 INJECTION, SOLUTION SUBCUTANEOUS at 23:25

## 2022-12-23 RX ADMIN — Medication 1 CAPSULE: at 08:27

## 2022-12-23 RX ADMIN — ACETAMINOPHEN 650 MG: 325 TABLET ORAL at 23:22

## 2022-12-23 RX ADMIN — GUAIFENESIN AND DEXTROMETHORPHAN 10 ML: 100; 10 SYRUP ORAL at 23:22

## 2022-12-23 RX ADMIN — Medication 1 TABLET: at 08:27

## 2022-12-23 ASSESSMENT — PAIN SCALES - GENERAL
PAINLEVEL_OUTOF10: 0
PAINLEVEL_OUTOF10: 6
PAINLEVEL_OUTOF10: 0

## 2022-12-23 ASSESSMENT — PAIN DESCRIPTION - LOCATION: LOCATION: HEAD;THROAT

## 2022-12-23 NOTE — PROGRESS NOTES
[facility] Progress Note           Subjective/   61y.o. year old male who we are seeing in consultation for Hyponatremia. HPI:  Sodium trend back at baseline with IVF's. BS ~200-300 range. Intake adequate. ROS:  Shortness of breath better, no weakness. Objective/   GEN:  Chronically ill, /69   Pulse 84   Temp 97.4 °F (36.3 °C) (Oral)   Resp 19   Ht 6' (1.829 m)   Wt 183 lb 4.8 oz (83.1 kg)   SpO2 94%   BMI 24.86 kg/m²   HEENT: non-icteric, no JVD  CV: S1, S2 without m/r/g; no LE edema  RESP: CTA B without w/r/r; breathing wnl  ABD: +bs, soft, nt, no hsm  SKIN: warm, no rashes    Data/  Recent Labs     12/20/22  1314 12/22/22  0358 12/23/22  0443   WBC 20.9* 13.4* 12.1*   HGB 11.7* 10.7* 9.7*   HCT 33.3* 30.8* 27.3*   MCV 93.2 93.8 91.7    461* 450     Recent Labs     12/21/22  0842 12/22/22  0358 12/22/22  0918 12/22/22  1135   * 129* 128* 128*   K 4.1 5.1 4.6 4.6   CL 93* 96* 96* 95*   CO2 21 18* 16* 18*   GLUCOSE 164* 264* 246* 312*   PHOS 2.0* 3.0 3.2 2.9   MG 1.50*  --   --   --    BUN <2* 6* 6* 8   CREATININE <0.5* 0.6* <0.5* 0.6*   LABGLOM >60 >60 >60 >60       Assessment/     - Hyponatremia - acute on chronic, baseline high 120, low 130 range                Acute component improved with IVF's suggestive of pre-renal state +/- translocation   effect from hyperglycemia as well                Chronic component suspected from DM issues     - Anion-gap metabolic acidosis with concern for early DKA     - Multi-focal PNA - plans per Admitting       Plan/     - IVF's with bicarb replacement per Pulmonary team  - Trend labs, bp's, & urine output    ____________________________________  Mukesh Dunbar MD  [facility]  [email or web address]  Office: [phone]

## 2022-12-23 NOTE — CARE COORDINATION
Patient's blood sugars managed suboptimally per RN . He often refuses recommendations and chooses to manage his own blood sugar. IPTA from home alone. Per RN not medically ready today. Sister will transport home when ready per patient. Will follow.

## 2022-12-23 NOTE — PROGRESS NOTES
Hospitalist ICU Progress Note    CC: Multifocal pneumonia    Hospital course:  61 y.o. male who presented to the ED directly from his PCPs office after routine blood work revealed acute on chronic severe hyponatremia (118) and left shifted leukocytosis. Upon further questioning patient reports that he has been ill for greater than 1 weeks duration with fever, chills, runny nose and cough. He is unaware of any recent sick exposures, but is being evaluated during an influenza A outbreak. Patient has a complex medical history which includes pancreatic endocrine and exocrine insufficiency related to past EtOH abuse (currently in sustained remission x15 years). As a result, he is an insulin-dependent diabetic he reports that he was compliant with his regimen in most circumstances. Patient does continue to smoke tobacco.     Upon arrival to the ED patient was tachycardic, tachypneic, and with soft /59. Stat EKG demonstrates NSR without evidence of ischemia. CXR notable for right-sided multifocal pneumonia, as well as JACE pulmonary nodule. Influenza A/B and SARS-CoV-2 testing performed and noted to be negative. Labs obtained and notable for increased anion gap metabolic acidosis, acute hyperglycemia, large ketonuria consistent with DKA. Other lab abnormalities include: Acute on chronic hyponatremia 118, left shifted leukocytosis 20.9, and anemia with H/H 11.7/33. 3. Patient initiated on 2 L IV fluid bolus per ED attending prior to admit request.  He also received a dosage of IV Rocephin and Zithromax following collection of blood cultures. Ultimately patient will require ICU admission to assist with DKA, severe hyponatremia, and multifocal pneumonia. Pt did not require insulin drip. Glucose was improving. Bronch did not appreciate any specific mass, biopsy was obtained via EBUS. So far negative.   Pt breathing better today    Admit date: 12/20/2022  Days in hospital:  3    24 Hour Events: coughing episodes much improved    Subjective: bronch removed significant mucous plugging, likely why pt is better - glucose is not controlled - pt would like more control with his regimen - he currently takes degludec 12 units nightly as well as lispro 8 units tid with SSI - sodium is now stable at 128 -     Glucose remains uncontrolled and aic elevated at 9.1% - pt still insistent on managing himself - will liberalize sliding scale    ROS:  A comprehensive review of systems was negative except for: cough with sputum, shortness of breath      Objective:    VITALS:  /76   Pulse 92   Temp 97.9 °F (36.6 °C) (Oral)   Resp 17   Ht 6' (1.829 m)   Wt 183 lb 4.8 oz (83.1 kg)   SpO2 95%   BMI 24.86 kg/m²     Gen: ill appearing  HEENT: NC/AT, moist mucous membranes, no oropharyngeal erythema or exudate    Neck: supple, trachea midline, no anterior cervical or SC LAD  Heart:  Normal s1/s2, RRR, no murmurs, gallops, or rubs. no leg edema  Lungs:  some rhonchi bilaterally, no wheeze, no rales, some bilat rhonchi, no crackles, no use of accessory muscles  Abd: bowel sounds present, soft, nontender, nondistended, no masses  Extrem:  No clubbing, cyanosis,  no edema, peripheral pulses 2+, brisk capillary refill  Skin: no rashes or lesions, normal color/perfusion  Psych:  A & O x3    Neuro: grossly intact, moves all four extremities. Radiology (personally reviewed by me):  CXR 12/21:    Impression:        No significant improvement. Persistent multifocal infiltrates and left upper   lobe lung nodule.      No pneumothorax        Assessment:    Principal Problem:    Multifocal pneumonia  Active Problems:    Acute hyponatremia    Uncontrolled type 2 diabetes mellitus with hyperglycemia (HCC)    Tobacco abuse    Exocrine pancreatic insufficiency    Normal anion gap metabolic acidosis    Excessive weight loss    Leukocytosis    Engages in vaping    Mediastinal adenopathy Hypophosphatemia    Alcohol use disorder, severe, in sustained remission ([facility] [address]  Resolved Problems:    * No resolved hospital problems. *      Plan:   Hyponatremia - acute on chronic  - did correct rapidly - monitor closely for symptoms - nephrology had been consulted, but I think pt is close to baseline when reviewing old records - sodium is 132 today  Multifocal pneumonia - on ceftriaxone and azithromycin and lactoabcillus  Possible lung mass with lymphadenopathy - bronch neg for malignancy at this time so less likely  DM2 with uncontrolled with hyperglycemia - on SSI - he will manage his own glucose now and administer with pharmacy guidance - they will come and log in his meds - not doing a good job - will liberalize use of sliding scale insulin        Prognosis:  Fair    Code status:  Full code    DVT prophylaxis: Lovenox  GI prophylaxis: Proton Pump Inhibitor  Antibiotic prophylaxis indicated:   yes - lactobacillus    Nasal decolonization:  Bactroban  Diet:  ADULT DIET;  Regular; 4 carb choices (60 gm/meal)    Disposition:  Home    Medications:  Scheduled Meds:   citalopram  40 mg Oral Daily    lisinopril  5 mg Oral Daily    atorvastatin  10 mg Oral Daily    therapeutic multivitamin-minerals  1 tablet Oral Daily    traZODone  100 mg Oral Nightly    lactobacillus  1 capsule Oral BID WC    Insulin Degludec  12 Units SubCUTAneous Nightly    insulin lispro  8 Units SubCUTAneous TID WC    insulin lispro  0-4 Units SubCUTAneous TID WC    insulin lispro  0-4 Units SubCUTAneous Nightly    sodium chloride flush  5-40 mL IntraVENous 2 times per day    cefTRIAXone (ROCEPHIN) IV  1,000 mg IntraVENous Q24H    azithromycin  500 mg IntraVENous Q24H    enoxaparin  40 mg SubCUTAneous Daily    nicotine  1 patch TransDERmal Daily       PRN Meds:  ondansetron **OR** ondansetron, polyethylene glycol, guaiFENesin-dextromethorphan, glucose, dextrose bolus **OR** dextrose bolus, glucagon (rDNA), dextrose, sodium chloride flush, benzonatate, ipratropium-albuterol, acetaminophen    IV:   dextrose      sodium bicarbonate infusion 50 mL/hr at 12/22/22 1856           Intake/Output Summary (Last 24 hours) at 12/23/2022 1615  Last data filed at 12/22/2022 2302  Gross per 24 hour   Intake 720 ml   Output --   Net 720 ml       Results:  CBC:   Recent Labs     12/22/22  0358 12/23/22  0443   WBC 13.4* 12.1*   HGB 10.7* 9.7*   HCT 30.8* 27.3*   MCV 93.8 91.7   * 450     BMP:   Recent Labs     12/22/22  0358 12/22/22  0918 12/22/22  1135 12/23/22  1210   * 128* 128* 132*   K 5.1 4.6 4.6 3.9   CL 96* 96* 95* 95*   CO2 18* 16* 18* 30   PHOS 3.0 3.2 2.9  --    BUN 6* 6* 8 9   CREATININE 0.6* <0.5* 0.6* 0.7*     Mag: No results for input(s): MAG in the last 72 hours. LIVER PROFILE:   Recent Labs     12/22/22 0358   AST 11*   ALT 14   BILITOT <0.2   ALKPHOS 90     PT/INR:   Recent Labs     12/22/22  0358 12/23/22 0443   PROTIME 15.1* 13.8   INR 1.19* 1.07     APTT: No results for input(s): APTT in the last 72 hours. UA:  No results for input(s): NITRITE, COLORU, PHUR, LABCAST, WBCUA, RBCUA, MUCUS, TRICHOMONAS, YEAST, BACTERIA, CLARITYU, SPECGRAV, LEUKOCYTESUR, UROBILINOGEN, BILIRUBINUR, BLOODU, GLUCOSEU, AMORPHOUS in the last 72 hours.     Invalid input(s): KETONESU      ABG: No results found for: PHART, PH, ALX0OIV, PCO2, PO2ART, PO2, KLQ2RQV, HCO3, BEART, BE, THGBART, THB, AUV2VBH, X6JXBEWR, O2SAT    Lab Results   Component Value Date    CALCIUM 8.5 12/23/2022    PHOS 2.9 12/22/2022             Electronically signed by Bry Cuevas MD on 12/23/2022 at 4:15 PM

## 2022-12-23 NOTE — PROGRESS NOTES
INPATIENT PULMONARY CRITICAL CARE PROGRESS NOTE      Reason for visit     DKA    SUBJECTIVE: Patient when seen this morning was comfortably sleeping in the bed without any significant shortness of breath or increased work of breathing, patient was afebrile and hemodynamically maintained, patient was on room air oxygen with saturation of 93%, patient's blood sugars were not optimally controlled, patient urine output has not been recorded in the epic for review;no other pertinent ROS of concern        Physical Exam:  Blood pressure (!) 144/89, pulse 92, temperature 98 °F (36.7 °C), temperature source Axillary, resp. rate 18, height 6' (1.829 m), weight 183 lb 4.8 oz (83.1 kg), SpO2 94 %.'     Constitutional: No respiratory distress  HENT:  Oropharynx is clear and moist. No thyromegaly. Eyes:  Conjunctivae are normal. Pupils equal, round, and reactive to light. No scleral icterus. Neck: . No tracheal deviation present. No obvious thyroid mass. Cardiovascular: sinus tachycardia  normal heart sounds. No right ventricular heave. No lower extremity edema. Pulmonary/Chest: Minimal wheezes. Decreased bilateral rales. No significant chest congestion chest wall is not dull to percussion. No accessory muscle usage or stridor. Some tachypnea  Abdominal: Soft. Bowel sounds present. No distension or hernia. No tenderness. Musculoskeletal: No cyanosis. No clubbing. No obvious joint deformity. Lymphadenopathy: No cervical or supraclavicular adenopathy. Skin: Skin is warm and dry. No rash or nodules on the exposed extremities.   Neurologic: sleeping when seen       Results:  CBC:   Recent Labs     12/22/22  0358 12/23/22  0443   WBC 13.4* 12.1*   HGB 10.7* 9.7*   HCT 30.8* 27.3*   MCV 93.8 91.7   * 450     BMP:   Recent Labs     12/22/22  0918 12/22/22  1135 12/23/22  1210 12/23/22  1736   * 128* 132* 130*   K 4.6 4.6 3.9 3.8   CL 96* 95* 95* 93*   CO2 16* 18* 30 31   PHOS 3.2 2.9  --  2.8   BUN 6* 8 9 9 CREATININE <0.5* 0.6* 0.7* 0.6*     LIVER PROFILE:   Recent Labs     12/22/22  0358   AST 11*   ALT 14   BILITOT <0.2   ALKPHOS 90           Imaging:  I have reviewed radiology images personally. CT CHEST WO CONTRAST   Final Result   Multifocal consolidative change seen throughout the lungs, greatest in the   right upper lobe, favored to be secondary to pneumonia. There is scattered   intervening septal thickening in the areas of consolidation on the right. Recommend follow-up to radiographic resolution to exclude underlying   neoplastic etiology in the areas of consolidation      Small mediastinal nodes are noted, likely reactive in the absence of a known   primary      RECOMMENDATIONS:            XR CHEST (2 VW)   Final Result   Multifocal pneumonia on the right      Small pulmonary nodule in left upper lobe, not clearly calcified. Consider   noncontrast chest CT for further characterization. RECOMMENDATION:              XR CHEST (2 VW)    Result Date: 12/20/2022  EXAMINATION: TWO XRAY VIEWS OF THE CHEST 12/20/2022 1:21 pm COMPARISON: None. HISTORY: ORDERING SYSTEM PROVIDED HISTORY: tachy, febrile, r/o PNA TECHNOLOGIST PROVIDED HISTORY: Reason for exam:->tachy, febrile, r/o PNA Reason for Exam: tachy, febrile, r/o PNA, cough FINDINGS: Multifocal parenchymal opacities are seen throughout the right lung. .  There is underlying emphysema. There is a focal pulmonary nodule in the left upper lobe, not clearly calcified No significant pleural fluid. There is likely underlying emphysema. Multifocal pneumonia on the right Small pulmonary nodule in left upper lobe, not clearly calcified. Consider noncontrast chest CT for further characterization.      CT CHEST WO CONTRAST    Result Date: 12/20/2022  EXAMINATION: CT OF THE CHEST WITHOUT CONTRAST 12/20/2022 4:21 pm TECHNIQUE: CT of the chest was performed without the administration of intravenous contrast. Multiplanar reformatted images are provided for review. Automated exposure control, iterative reconstruction, and/or weight based adjustment of the mA/kV was utilized to reduce the radiation dose to as low as reasonably achievable. COMPARISON: None. HISTORY: ORDERING SYSTEM PROVIDED HISTORY: Multifocal pn/hyponatremia/weight loss of 40 lbs/smoker TECHNOLOGIST PROVIDED HISTORY: Reason for exam:->Multifocal pn/hyponatremia/weight loss of 40 lbs/smoker Reason for Exam: pneumonia, cough x several weeks Relevant Medical/Surgical History: smoker x 40 yrs FINDINGS: Mediastinum: Thyroid gland is unremarkable. Atherosclerotic change seen in aorta. Trace pericardial fluid is seen. Small mediastinal and hilar nodes are noted. Index precarinal node measures 1.5 cm x 2.1 cm. Lungs/pleura: Respiratory motion artifact limits evaluation of fine pulmonary parenchymal change There is a focal nodule seen in the left upper lobe, which measures 9 mm. This contains areas of increased density internally, likely early calcification. Patchy nodularity is seen in the lingula. Scattered patchy nodularity is seen in the left lower lobe. .  No significant pleural fluid on the left. On the right, patchy and confluent parenchymal opacity is seen in the right upper lobe. There is intervening septal thickening seen. Simone Le Patchy ground-glass opacity is seen in the right middle lobe. Patchy ground-glass opacity is seen in the right lower lobe. No significant pleural fluid on the right. Upper Abdomen: Right adrenal gland is normal.  Left adrenal gland is normal. Soft Tissues/Bones: Spurring is seen in the spine. Multifocal consolidative change seen throughout the lungs, greatest in the right upper lobe, favored to be secondary to pneumonia. There is scattered intervening septal thickening in the areas of consolidation on the right.  Recommend follow-up to radiographic resolution to exclude underlying neoplastic etiology in the areas of consolidation Small mediastinal nodes are noted, likely reactive in the absence of a known primary         A. Lung, right upper lobe, brushings:        -  No malignant cells identified     B. Lymph node, subcarinal, endobronchial ultrasound guided fine needle   aspiration:        -  No malignant cells identified     C. Lymph node, paratracheal, endobronchial ultrasound guided fine needle   aspiration:        -  No malignant cells identified     D. Lung, right upper lobe, bronchial alveolar lavage:        -  No malignant cells identified     Lung, right upper lobe, biopsy:   - Fragments of benign bronchial wall tissue with moderate chronic   inflammation.   - No granulomatous inflammation, specific viral cytopathic changes or   malignancy identified. Assessment:  Principal Problem:    Multifocal pneumonia  Active Problems:    Acute hyponatremia    Uncontrolled type 2 diabetes mellitus with hyperglycemia (HCC)    Tobacco abuse    Exocrine pancreatic insufficiency    Normal anion gap metabolic acidosis    Excessive weight loss    Leukocytosis    Engages in vaping    Mediastinal adenopathy    Hypophosphatemia    Alcohol use disorder, severe, in sustained remission ([facility] [address]  Resolved Problems:    * No resolved hospital problems.  *          Plan:   Oxygen supplementation, if required, to keep saturation 90 and 94% only  Patient was on RA when seen   Pulmonary toilet  S/p Bronchoscopy with EBUS and the resulst are benign so far  IV Rocephin and Zithromax to be continued  Urine antigen for Legionella was negative  Patient's LFT is normal  Patient has been on Celexa at home  Patient's anion gap is closed and patient has hyperglycemia secondary to uncontrolled diabetes mellitus  Patient 'smetabolic acidemia has improved with bicarb drip-being discontinued  Bronchodilators to continue  No need for any systemic steroids for now  Patient's BMP to be followed  Monitor input output and BMP  Correct electrolytes and whenever necessary basis  Smoking cessation advised  Nicotine replacement therapy if the patient wants  Insulins as per IM -in discussion with the patient  BGM with SSI  Patient's blood sugars are not controlled at this time   IM to decide upon continuation of Creon and statins  PUD and DVT prophylaxis  Ambulate as tolerated        Electronically signed by:  Torrey Edmond MD    12/23/2022    8:13 PM.

## 2022-12-23 NOTE — PROGRESS NOTES
Blood sugars managed suboptimally. Patient choosing to manage on his own. He often refuses our recommendations, regardless of his elevated blood sugar levels. Usually he will allow the scheduled prandial dose, but refusing SSI.

## 2022-12-24 VITALS
BODY MASS INDEX: 24.76 KG/M2 | DIASTOLIC BLOOD PRESSURE: 82 MMHG | HEART RATE: 88 BPM | SYSTOLIC BLOOD PRESSURE: 122 MMHG | RESPIRATION RATE: 18 BRPM | HEIGHT: 72 IN | WEIGHT: 182.8 LBS | TEMPERATURE: 98 F | OXYGEN SATURATION: 97 %

## 2022-12-24 LAB
ADENOVIRUS PCR: NOT DETECTED
ALBUMIN SERPL-MCNC: 3.1 G/DL (ref 3.4–5)
ANION GAP SERPL CALCULATED.3IONS-SCNC: 6 MMOL/L (ref 3–16)
BANDED NEUTROPHILS RELATIVE PERCENT: 8 % (ref 0–7)
BASOPHILS ABSOLUTE: 0 K/UL (ref 0–0.2)
BASOPHILS RELATIVE PERCENT: 0 %
BLOOD CULTURE, ROUTINE: NORMAL
BUN BLDV-MCNC: 8 MG/DL (ref 7–20)
CALCIUM SERPL-MCNC: 9 MG/DL (ref 8.3–10.6)
CHLORIDE BLD-SCNC: 96 MMOL/L (ref 99–110)
CO2: 33 MMOL/L (ref 21–32)
CREAT SERPL-MCNC: 0.6 MG/DL (ref 0.9–1.3)
CULTURE, BLOOD 2: NORMAL
EOSINOPHILS ABSOLUTE: 0 K/UL (ref 0–0.6)
EOSINOPHILS RELATIVE PERCENT: 0 %
GFR SERPL CREATININE-BSD FRML MDRD: >60 ML/MIN/{1.73_M2}
GLUCOSE BLD-MCNC: 160 MG/DL (ref 70–99)
GLUCOSE BLD-MCNC: 162 MG/DL (ref 70–99)
GLUCOSE BLD-MCNC: 185 MG/DL (ref 70–99)
GLUCOSE BLD-MCNC: 246 MG/DL (ref 70–99)
HCT VFR BLD CALC: 31.2 % (ref 40.5–52.5)
HEMOGLOBIN: 10.6 G/DL (ref 13.5–17.5)
HUMAN METAPNEUMOVIRUS PCR: NOT DETECTED
INFLUENZA A: NOT DETECTED
INFLUENZA B: NOT DETECTED
INR BLD: 1.01 (ref 0.87–1.14)
LYMPHOCYTES ABSOLUTE: 1.8 K/UL (ref 1–5.1)
LYMPHOCYTES RELATIVE PERCENT: 20 %
MCH RBC QN AUTO: 31.3 PG (ref 26–34)
MCHC RBC AUTO-ENTMCNC: 33.9 G/DL (ref 31–36)
MCV RBC AUTO: 92.4 FL (ref 80–100)
METAMYELOCYTES RELATIVE PERCENT: 2 %
MONOCYTES ABSOLUTE: 0.9 K/UL (ref 0–1.3)
MONOCYTES RELATIVE PERCENT: 10 %
NEUTROPHILS ABSOLUTE: 6.4 K/UL (ref 1.7–7.7)
NEUTROPHILS RELATIVE PERCENT: 60 %
PARAINFLUENZA 1 PCR: NOT DETECTED
PARAINFLUENZA 2 PCR: NOT DETECTED
PARAINFLUENZA 3 PCR: NOT DETECTED
PARAINFLUENZA 4 PCR: NOT DETECTED
PDW BLD-RTO: 12.6 % (ref 12.4–15.4)
PERFORMED ON: ABNORMAL
PHOSPHORUS: 3.6 MG/DL (ref 2.5–4.9)
PLATELET # BLD: 490 K/UL (ref 135–450)
PLATELET SLIDE REVIEW: ABNORMAL
PMV BLD AUTO: 6.6 FL (ref 5–10.5)
POTASSIUM SERPL-SCNC: 4.2 MMOL/L (ref 3.5–5.1)
PROTHROMBIN TIME: 13.2 SEC (ref 11.7–14.5)
RBC # BLD: 3.38 M/UL (ref 4.2–5.9)
RBC # BLD: NORMAL 10*6/UL
RHINO/ENTEROVIRUS PCR: NOT DETECTED
RSV BY PCR: NOT DETECTED
RSV SOURCE: NORMAL
SODIUM BLD-SCNC: 135 MMOL/L (ref 136–145)
WBC # BLD: 9.2 K/UL (ref 4–11)

## 2022-12-24 PROCEDURE — 85025 COMPLETE CBC W/AUTO DIFF WBC: CPT

## 2022-12-24 PROCEDURE — 80069 RENAL FUNCTION PANEL: CPT

## 2022-12-24 PROCEDURE — 6360000002 HC RX W HCPCS: Performed by: INTERNAL MEDICINE

## 2022-12-24 PROCEDURE — 2580000003 HC RX 258: Performed by: INTERNAL MEDICINE

## 2022-12-24 PROCEDURE — 6370000000 HC RX 637 (ALT 250 FOR IP): Performed by: INTERNAL MEDICINE

## 2022-12-24 PROCEDURE — 6370000000 HC RX 637 (ALT 250 FOR IP): Performed by: HOSPITALIST

## 2022-12-24 PROCEDURE — 36415 COLL VENOUS BLD VENIPUNCTURE: CPT

## 2022-12-24 PROCEDURE — 85610 PROTHROMBIN TIME: CPT

## 2022-12-24 PROCEDURE — 99232 SBSQ HOSP IP/OBS MODERATE 35: CPT | Performed by: INTERNAL MEDICINE

## 2022-12-24 RX ORDER — CEFUROXIME AXETIL 500 MG/1
500 TABLET ORAL 2 TIMES DAILY
Qty: 14 TABLET | Refills: 0 | Status: SHIPPED | OUTPATIENT
Start: 2022-12-24 | End: 2022-12-31

## 2022-12-24 RX ORDER — BENZONATATE 100 MG/1
100 CAPSULE ORAL 3 TIMES DAILY PRN
Qty: 21 CAPSULE | Refills: 0 | Status: SHIPPED | OUTPATIENT
Start: 2022-12-24 | End: 2022-12-31

## 2022-12-24 RX ADMIN — CEFTRIAXONE SODIUM 1000 MG: 1 INJECTION, POWDER, FOR SOLUTION INTRAMUSCULAR; INTRAVENOUS at 12:13

## 2022-12-24 RX ADMIN — Medication 1 TABLET: at 08:29

## 2022-12-24 RX ADMIN — AZITHROMYCIN MONOHYDRATE 500 MG: 500 INJECTION, POWDER, LYOPHILIZED, FOR SOLUTION INTRAVENOUS at 13:13

## 2022-12-24 RX ADMIN — ENOXAPARIN SODIUM 40 MG: 100 INJECTION SUBCUTANEOUS at 08:29

## 2022-12-24 RX ADMIN — Medication 1 CAPSULE: at 08:29

## 2022-12-24 RX ADMIN — ATORVASTATIN CALCIUM 10 MG: 10 TABLET, FILM COATED ORAL at 08:29

## 2022-12-24 RX ADMIN — CITALOPRAM HYDROBROMIDE 40 MG: 20 TABLET ORAL at 08:29

## 2022-12-24 RX ADMIN — INSULIN LISPRO 8 UNITS: 100 INJECTION, SOLUTION INTRAVENOUS; SUBCUTANEOUS at 08:32

## 2022-12-24 RX ADMIN — INSULIN LISPRO 3 UNITS: 100 INJECTION, SOLUTION INTRAVENOUS; SUBCUTANEOUS at 03:36

## 2022-12-24 RX ADMIN — LISINOPRIL 5 MG: 5 TABLET ORAL at 08:29

## 2022-12-24 RX ADMIN — INSULIN LISPRO 8 UNITS: 100 INJECTION, SOLUTION INTRAVENOUS; SUBCUTANEOUS at 12:43

## 2022-12-24 RX ADMIN — SODIUM CHLORIDE, PRESERVATIVE FREE 10 ML: 5 INJECTION INTRAVENOUS at 08:30

## 2022-12-24 ASSESSMENT — PAIN SCALES - GENERAL
PAINLEVEL_OUTOF10: 0

## 2022-12-24 NOTE — PROGRESS NOTES
INPATIENT PULMONARY CRITICAL CARE PROGRESS NOTE      Reason for visit     DKA    SUBJECTIVE: Patient when seen this morning was feeling better, patient has some cough with mucoid expectoration which is not significant, patient does not have any increasing shortness of breath or wheezing, no chest pain or palpitations, patient was not having any increased work of breathing, patient was afebrile and hemodynamically maintained, patient blood sugars are still on the higher side, patient has sinus rhythm on the monitor, patient was not hypoxemic and was on room air oxygen with saturation of 95% when seen, patient's urine output was adequate with cumulative fluid balance being -92 mL, patient was alert and oriented, no other pertinent review of system of concern     Physical Exam:  Blood pressure 135/87, pulse 92, temperature 98.3 °F (36.8 °C), temperature source Oral, resp. rate 18, height 6' (1.829 m), weight 182 lb 12.8 oz (82.9 kg), SpO2 95 %.'     Constitutional: No respiratory distress  HENT:  Oropharynx is clear and moist. No thyromegaly. Eyes:  Conjunctivae are normal. Pupils equal, round, and reactive to light. No scleral icterus. Neck: . No tracheal deviation present. No obvious thyroid mass. Cardiovascular: sinus rhythm normal heart sounds. No right ventricular heave. No lower extremity edema. Pulmonary/Chest: No wheezes. Minimal bilateral rales. No significant chest congestion chest wall is not dull to percussion. No accessory muscle usage or stridor. Some tachypnea  Abdominal: Soft. Bowel sounds present. No distension or hernia. No tenderness. Musculoskeletal: No cyanosis. No clubbing. No obvious joint deformity. Lymphadenopathy: No cervical or supraclavicular adenopathy. Skin: Skin is warm and dry. No rash or nodules on the exposed extremities.   Neurologic: sleeping when seen       Results:  CBC:   Recent Labs     12/22/22  0358 12/23/22  0443 12/24/22  0442   WBC 13.4* 12.1* 9.2   HGB 10.7* 9.7* 10.6*   HCT 30.8* 27.3* 31.2*   MCV 93.8 91.7 92.4   * 450 490*       BMP:   Recent Labs     12/22/22  1135 12/23/22  1210 12/23/22  1736 12/24/22  0442   * 132* 130* 135*   K 4.6 3.9 3.8 4.2   CL 95* 95* 93* 96*   CO2 18* 30 31 33*   PHOS 2.9  --  2.8 3.6   BUN 8 9 9 8   CREATININE 0.6* 0.7* 0.6* 0.6*       LIVER PROFILE:   Recent Labs     12/22/22  0358   AST 11*   ALT 14   BILITOT <0.2   ALKPHOS 90             Imaging:  I have reviewed radiology images personally. CT CHEST WO CONTRAST   Final Result   Multifocal consolidative change seen throughout the lungs, greatest in the   right upper lobe, favored to be secondary to pneumonia. There is scattered   intervening septal thickening in the areas of consolidation on the right. Recommend follow-up to radiographic resolution to exclude underlying   neoplastic etiology in the areas of consolidation      Small mediastinal nodes are noted, likely reactive in the absence of a known   primary      RECOMMENDATIONS:            XR CHEST (2 VW)   Final Result   Multifocal pneumonia on the right      Small pulmonary nodule in left upper lobe, not clearly calcified. Consider   noncontrast chest CT for further characterization. RECOMMENDATION:              XR CHEST (2 VW)    Result Date: 12/20/2022  EXAMINATION: TWO XRAY VIEWS OF THE CHEST 12/20/2022 1:21 pm COMPARISON: None. HISTORY: ORDERING SYSTEM PROVIDED HISTORY: tachy, febrile, r/o PNA TECHNOLOGIST PROVIDED HISTORY: Reason for exam:->tachy, febrile, r/o PNA Reason for Exam: tachy, febrile, r/o PNA, cough FINDINGS: Multifocal parenchymal opacities are seen throughout the right lung. .  There is underlying emphysema. There is a focal pulmonary nodule in the left upper lobe, not clearly calcified No significant pleural fluid. There is likely underlying emphysema. Multifocal pneumonia on the right Small pulmonary nodule in left upper lobe, not clearly calcified.   Consider noncontrast chest CT for further characterization. CT CHEST WO CONTRAST    Result Date: 12/20/2022  EXAMINATION: CT OF THE CHEST WITHOUT CONTRAST 12/20/2022 4:21 pm TECHNIQUE: CT of the chest was performed without the administration of intravenous contrast. Multiplanar reformatted images are provided for review. Automated exposure control, iterative reconstruction, and/or weight based adjustment of the mA/kV was utilized to reduce the radiation dose to as low as reasonably achievable. COMPARISON: None. HISTORY: ORDERING SYSTEM PROVIDED HISTORY: Multifocal pn/hyponatremia/weight loss of 40 lbs/smoker TECHNOLOGIST PROVIDED HISTORY: Reason for exam:->Multifocal pn/hyponatremia/weight loss of 40 lbs/smoker Reason for Exam: pneumonia, cough x several weeks Relevant Medical/Surgical History: smoker x 40 yrs FINDINGS: Mediastinum: Thyroid gland is unremarkable. Atherosclerotic change seen in aorta. Trace pericardial fluid is seen. Small mediastinal and hilar nodes are noted. Index precarinal node measures 1.5 cm x 2.1 cm. Lungs/pleura: Respiratory motion artifact limits evaluation of fine pulmonary parenchymal change There is a focal nodule seen in the left upper lobe, which measures 9 mm. This contains areas of increased density internally, likely early calcification. Patchy nodularity is seen in the lingula. Scattered patchy nodularity is seen in the left lower lobe. .  No significant pleural fluid on the left. On the right, patchy and confluent parenchymal opacity is seen in the right upper lobe. There is intervening septal thickening seen. Lucyann Push Patchy ground-glass opacity is seen in the right middle lobe. Patchy ground-glass opacity is seen in the right lower lobe. No significant pleural fluid on the right. Upper Abdomen: Right adrenal gland is normal.  Left adrenal gland is normal. Soft Tissues/Bones: Spurring is seen in the spine.      Multifocal consolidative change seen throughout the lungs, greatest in the right upper lobe, favored to be secondary to pneumonia. There is scattered intervening septal thickening in the areas of consolidation on the right. Recommend follow-up to radiographic resolution to exclude underlying neoplastic etiology in the areas of consolidation Small mediastinal nodes are noted, likely reactive in the absence of a known primary         A. Lung, right upper lobe, brushings:        -  No malignant cells identified     B. Lymph node, subcarinal, endobronchial ultrasound guided fine needle   aspiration:        -  No malignant cells identified     C. Lymph node, paratracheal, endobronchial ultrasound guided fine needle   aspiration:        -  No malignant cells identified     D. Lung, right upper lobe, bronchial alveolar lavage:        -  No malignant cells identified     Lung, right upper lobe, biopsy:   - Fragments of benign bronchial wall tissue with moderate chronic   inflammation.   - No granulomatous inflammation, specific viral cytopathic changes or   malignancy identified. Assessment:  Principal Problem:    Multifocal pneumonia  Active Problems:    Acute hyponatremia    Uncontrolled type 2 diabetes mellitus with hyperglycemia (HCC)    Tobacco abuse    Exocrine pancreatic insufficiency    Normal anion gap metabolic acidosis    Excessive weight loss    Leukocytosis    Engages in vaping    Mediastinal adenopathy    Hypophosphatemia    Alcohol use disorder, severe, in sustained remission ([facility] [address]  Resolved Problems:    * No resolved hospital problems.  *          Plan:   Oxygen supplementation, if required, to keep saturation 90 and 94% only  Patient was on RA when seen   Pulmonary toilet  S/p Bronchoscopy with EBUS and the resulst are benign so far  IV Rocephin and Zithromax can be changed to p.o. if deemed appropriate  Urine antigen for Legionella was negative  Patient's LFT is normal  Patient has been on Celexa at home  Patient's anion gap is closed and patient has hyperglycemia secondary to uncontrolled diabetes mellitus  Patient does not have any metabolic acidemia now and remains off fluids  Bronchodilators to continue  No need for any systemic steroids for now  Monitor input output and BMP  Correct electrolytes and whenever necessary basis  Smoking cessation advised  Nicotine replacement therapy if the patient wants  Insulins as per IM -in discussion with the patient  BGM with SSI  Patient's blood sugars are not controlled at this time   IM to decide upon continuation of Creon and statins  PUD and DVT prophylaxis  Ambulate as tolerated    ? Discharge planning    Patient will be needing a CT of the chest in 6 weeks time to assess the pulm infiltrates and adenopathy-can be arranged by my  office in follow-up after that    Cussed with patient, nursing and IM        Electronically signed by:  Jose Falcon MD    12/24/2022    10:47 AM.

## 2022-12-24 NOTE — PROGRESS NOTES
Patient has been voiding in the toilet - educated on importance of accurate I/O. Urinals placed in bathroom.

## 2022-12-24 NOTE — PLAN OF CARE
Problem: Discharge Planning  Goal: Discharge to home or other facility with appropriate resources  Outcome: Progressing  Flowsheets (Taken 12/23/2022 1114 by Jennifer Schwartz, RN)  Discharge to home or other facility with appropriate resources: Identify barriers to discharge with patient and caregiver     Problem: Pain  Goal: Verbalizes/displays adequate comfort level or baseline comfort level  Outcome: Progressing     Problem: Respiratory - Adult  Goal: Achieves optimal ventilation and oxygenation  Outcome: Progressing  Flowsheets (Taken 12/23/2022 1114 by Jennifer Schwartz RN)  Achieves optimal ventilation and oxygenation: Assess for changes in respiratory status     Problem: Metabolic/Fluid and Electrolytes - Adult  Goal: Electrolytes maintained within normal limits  Outcome: Progressing  Flowsheets (Taken 12/23/2022 1114 by Jennifer Schwartz RN)  Electrolytes maintained within normal limits: Monitor labs and assess patient for signs and symptoms of electrolyte imbalances  Goal: Glucose maintained within prescribed range  Outcome: Progressing  Flowsheets (Taken 12/23/2022 1114 by Jennifer Schwartz RN)  Glucose maintained within prescribed range: Monitor blood glucose as ordered     Problem: Chronic Conditions and Co-morbidities  Goal: Patient's chronic conditions and co-morbidity symptoms are monitored and maintained or improved  Outcome: Progressing  Flowsheets (Taken 12/23/2022 1114 by Jennifer Schwartz, RN)  Care Plan - Patient's Chronic Conditions and Co-Morbidity Symptoms are Monitored and Maintained or Improved: Monitor and assess patient's chronic conditions and comorbid symptoms for stability, deterioration, or improvement     Problem: Safety - Adult  Goal: Free from fall injury  Outcome: Progressing     Problem: ABCDS Injury Assessment  Goal: Absence of physical injury  Outcome: Progressing

## 2022-12-24 NOTE — PROGRESS NOTES
Hospitalist Progress Note      PCP: Emerald Ayoub    Date of Admission: 12/20/2022    Chief Complaint: Multifocal pneumonia    Hospital Course:     61 y.o. male who presented to the ED directly from his PCPs office after routine blood work revealed acute on chronic severe hyponatremia (118) and left shifted leukocytosis. Upon further questioning patient reports that he has been ill for greater than 1 weeks duration with fever, chills, runny nose and cough. He is unaware of any recent sick exposures, but is being evaluated during an influenza A outbreak. Patient has a complex medical history which includes pancreatic endocrine and exocrine insufficiency related to past EtOH abuse (currently in sustained remission x15 years). As a result, he is an insulin-dependent diabetic he reports that he was compliant with his regimen in most circumstances. Patient does continue to smoke tobacco.     Upon arrival to the ED patient was tachycardic, tachypneic, and with soft /59. Stat EKG demonstrates NSR without evidence of ischemia. CXR notable for right-sided multifocal pneumonia, as well as JACE pulmonary nodule. Influenza A/B and SARS-CoV-2 testing performed and noted to be negative. Labs obtained and notable for increased anion gap metabolic acidosis, acute hyperglycemia, large ketonuria consistent with DKA. Other lab abnormalities include: Acute on chronic hyponatremia 118, left shifted leukocytosis 20.9, and anemia with H/H 11.7/33. 3. Patient initiated on 2 L IV fluid bolus per ED attending prior to admit request.  He also received a dosage of IV Rocephin and Zithromax following collection of blood cultures. Ultimately patient will require ICU admission to assist with DKA, severe hyponatremia, and multifocal pneumonia. Pt did not require insulin drip. Glucose was improving. Bronch did not appreciate any specific mass, biopsy was obtained via EBUS. So far negative.     Subjective: Patient denies any chest pain complains of some shortness of breath no nausea vomiting currently on room air. Medications:  Reviewed    Infusion Medications    dextrose       Scheduled Medications    insulin lispro  0-4 Units SubCUTAneous Q4H    citalopram  40 mg Oral Daily    lisinopril  5 mg Oral Daily    atorvastatin  10 mg Oral Daily    therapeutic multivitamin-minerals  1 tablet Oral Daily    traZODone  100 mg Oral Nightly    lactobacillus  1 capsule Oral BID WC    Insulin Degludec  12 Units SubCUTAneous Nightly    insulin lispro  8 Units SubCUTAneous TID WC    insulin lispro  0-4 Units SubCUTAneous Nightly    sodium chloride flush  5-40 mL IntraVENous 2 times per day    cefTRIAXone (ROCEPHIN) IV  1,000 mg IntraVENous Q24H    azithromycin  500 mg IntraVENous Q24H    enoxaparin  40 mg SubCUTAneous Daily    nicotine  1 patch TransDERmal Daily     PRN Meds: ondansetron **OR** ondansetron, polyethylene glycol, guaiFENesin-dextromethorphan, glucose, dextrose bolus **OR** dextrose bolus, glucagon (rDNA), dextrose, sodium chloride flush, benzonatate, ipratropium-albuterol, acetaminophen      Intake/Output Summary (Last 24 hours) at 12/24/2022 0926  Last data filed at 12/24/2022 0644  Gross per 24 hour   Intake 2738.94 ml   Output 4100 ml   Net -1361.06 ml       Physical Exam Performed:    /87   Pulse 92   Temp 98.3 °F (36.8 °C) (Oral)   Resp 18   Ht 6' (1.829 m)   Wt 182 lb 12.8 oz (82.9 kg)   SpO2 95%   BMI 24.79 kg/m²     General appearance: No apparent distress, appears stated age and cooperative. HEENT: Pupils equal, round, and reactive to light. Conjunctivae/corneas clear. Neck: Supple, with full range of motion. No jugular venous distention. Trachea midline. Respiratory:  Normal respiratory effort. Clear to auscultation, bilaterally without Rales/Wheezes/Rhonchi. Cardiovascular: Regular rate and rhythm with normal S1/S2 without murmurs, rubs or gallops.   Abdomen: Soft, non-tender, non-distended with normal bowel sounds. Musculoskeletal: No clubbing, cyanosis or edema bilaterally. Full range of motion without deformity. Skin: Skin color, texture, turgor normal.  No rashes or lesions. Neurologic:  Neurovascularly intact without any focal sensory/motor deficits.  Cranial nerves: II-XII intact, grossly non-focal.  Psychiatric: Alert and oriented, thought content appropriate, normal insight  Capillary Refill: Brisk, 3 seconds, normal   Peripheral Pulses: +2 palpable, equal bilaterally       Labs:   Recent Labs     12/22/22  0358 12/23/22  0443 12/24/22  0442   WBC 13.4* 12.1* 9.2   HGB 10.7* 9.7* 10.6*   HCT 30.8* 27.3* 31.2*   * 450 490*     Recent Labs     12/22/22  1135 12/23/22  1210 12/23/22  1736 12/24/22 0442   * 132* 130* 135*   K 4.6 3.9 3.8 4.2   CL 95* 95* 93* 96*   CO2 18* 30 31 33*   BUN 8 9 9 8   CREATININE 0.6* 0.7* 0.6* 0.6*   CALCIUM 8.4 8.5 8.2* 9.0   PHOS 2.9  --  2.8 3.6     Recent Labs     12/22/22  0358   AST 11*   ALT 14   BILITOT <0.2   ALKPHOS 90     Recent Labs     12/22/22  0358 12/23/22 0443 12/24/22 0442   INR 1.19* 1.07 1.01     Recent Labs     12/22/22  0358   TROPONINI <0.01       Urinalysis:      Lab Results   Component Value Date/Time    NITRU Negative 12/20/2022 02:37 PM    WBCUA 0-2 12/20/2022 02:37 PM    RBCUA 0-2 12/20/2022 02:37 PM    BLOODU TRACE-LYSED 12/20/2022 02:37 PM    SPECGRAV 1.015 12/20/2022 02:37 PM    GLUCOSEU Negative 12/20/2022 02:37 PM       Radiology:      IP CONSULT TO CRITICAL CARE  IP CONSULT TO NEPHROLOGY  IP CONSULT TO CRITICAL CARE  IP CONSULT TO PHARMACY    Assessment/Plan:    Active Hospital Problems    Diagnosis     Mediastinal adenopathy [R59.0]      Priority: Medium    Hypophosphatemia [E83.39]      Priority: Medium    Acute hyponatremia [E87.1]      Priority: Medium    Multifocal pneumonia [J18.9]      Priority: Medium    Tobacco abuse [Z72.0]      Priority: Medium    Exocrine pancreatic insufficiency [K86.81]      Priority: Medium Uncontrolled type 2 diabetes mellitus with hyperglycemia (HCC) [E11.65]      Priority: Medium    Normal anion gap metabolic acidosis [N76.64]      Priority: Medium    Excessive weight loss [R63.4]      Priority: Medium    Leukocytosis [D72.829]      Priority: Medium    Engages in vaping [Z72.89]      Priority: Medium    Alcohol use disorder, severe, in sustained remission ([facility] [address] [F10.21]      Admitted with multifocal pneumonia treated with Rocephin and Zithromax pulmonary consult appreciated status post bronchoscopy with EBUS results so far benign patient remains afebrile no leukocytosis we will discharge patient home on p.o. Ceftin for 7 days patient to follow-up with the PCP within a week pulmonary as instructed. Diabetes mellitus type 2 admitted with a DKA resolved continue with the current care hemoglobin A1c= 9.2 on 12/22/2022. Hypertension continue with the lisinopril. Hyperlipidemia on statin. Depression continue with the trazodone and Celexa. DVT Prophylaxis: Lovenox subcu  Diet: ADULT DIET;  Regular; 4 carb choices (60 gm/meal)  Code Status: Full Code  PT/OT Eval Status:     Dispo -     Appropriate for A1 Discharge Unit: No      Asim Bah MD

## 2022-12-24 NOTE — PROGRESS NOTES
[address]  [address]  [address]  Phone: [phone]             December 24, 2022    Patient: Lester Brito   YOB: 1965   Date of Visit: 12/20/2022       To Whom It May Concern:    Lester Brito was seen and treated in our facility  beginning 12/20/2022 until 12/24/2022.       Sincerely,       Amelia Osman RN BSN        Signature:__________________________________

## 2022-12-26 NOTE — DISCHARGE SUMMARY
Hospital Medicine Discharge Summary    Patient ID: Eduardo Arnett      Patient's PCP: Allen Gee    Admit Date: 12/20/2022     Discharge Date: 12/24/2022      Admitting Provider: Jaci Finn MD     Discharge Provider: Geoffrey Ojeda MD     Discharge Diagnoses: Active Hospital Problems    Diagnosis     Mediastinal adenopathy [R59.0]      Priority: Medium    Hypophosphatemia [E83.39]      Priority: Medium    Acute hyponatremia [E87.1]      Priority: Medium    Multifocal pneumonia [J18.9]      Priority: Medium    Tobacco abuse [Z72.0]      Priority: Medium    Exocrine pancreatic insufficiency [K86.81]      Priority: Medium    Uncontrolled type 2 diabetes mellitus with hyperglycemia ([facility] [address] [E11.65]      Priority: Medium    Normal anion gap metabolic acidosis [S83.32]      Priority: Medium    Excessive weight loss [R63.4]      Priority: Medium    Leukocytosis [D72.829]      Priority: Medium    Engages in vaping [Z72.89]      Priority: Medium    Alcohol use disorder, severe, in sustained remission (HCC) [F10.21]        The patient was seen and examined on day of discharge and this discharge summary is in conjunction with any daily progress note from day of discharge. Hospital Course:   Admitted with multifocal pneumonia treated with Rocephin and Zithromax pulmonary consult appreciated status post bronchoscopy with EBUS results so far benign patient remains afebrile no leukocytosis we will discharge patient home on p.o. Ceftin for 7 days patient to follow-up with the PCP within a week pulmonary as instructed. Diabetes mellitus type 2 admitted with a DKA resolved continue with the current care hemoglobin A1c= 9.2 on 12/22/2022. Hypertension continue with the lisinopril. Hyperlipidemia on statin. Depression continue with the trazodone and Celexa.            Physical Exam Performed:     /82   Pulse 88   Temp 98 °F (36.7 °C) (Oral)   Resp 18   Ht 6' (1.829 m)   Wt 182 lb 12.8 oz (82.9 kg)   SpO2 97%   BMI 24.79 kg/m²       General appearance:  No apparent distress, appears stated age and cooperative. HEENT:  Normal cephalic, atraumatic without obvious deformity. Pupils equal, round, and reactive to light. Extra ocular muscles intact. Conjunctivae/corneas clear. Neck: Supple, with full range of motion. No jugular venous distention. Trachea midline. Respiratory:  Normal respiratory effort. Clear to auscultation, bilaterally without Rales/Wheezes/Rhonchi. Cardiovascular:  Regular rate and rhythm with normal S1/S2 without murmurs, rubs or gallops. Abdomen: Soft, non-tender, non-distended with normal bowel sounds. Musculoskeletal:  No clubbing, cyanosis or edema bilaterally. Full range of motion without deformity. Skin: Skin color, texture, turgor normal.  No rashes or lesions. Neurologic:  Neurovascularly intact without any focal sensory/motor deficits. Cranial nerves: II-XII intact, grossly non-focal.  Psychiatric:  Alert and oriented, thought content appropriate, normal insight  Capillary Refill: Brisk,< 3 seconds   Peripheral Pulses: +2 palpable, equal bilaterally       Labs:  For convenience and continuity at follow-up the following most recent labs are provided:      CBC:    Lab Results   Component Value Date/Time    WBC 9.2 12/24/2022 04:42 AM    HGB 10.6 12/24/2022 04:42 AM    HCT 31.2 12/24/2022 04:42 AM     12/24/2022 04:42 AM       Renal:    Lab Results   Component Value Date/Time     12/24/2022 04:42 AM    K 4.2 12/24/2022 04:42 AM    K 5.1 12/22/2022 03:58 AM    CL 96 12/24/2022 04:42 AM    CO2 33 12/24/2022 04:42 AM    BUN 8 12/24/2022 04:42 AM    CREATININE 0.6 12/24/2022 04:42 AM    CALCIUM 9.0 12/24/2022 04:42 AM    PHOS 3.6 12/24/2022 04:42 AM         Significant Diagnostic Studies    Radiology:          Consults:     IP CONSULT TO CRITICAL CARE  IP CONSULT TO NEPHROLOGY  IP CONSULT TO CRITICAL CARE  IP CONSULT TO PHARMACY    Disposition: Home    Condition at Discharge: Stable    Discharge Instructions/Follow-up: Follow-up with the PCP within a week, pulmonary as instructed. Code Status: Full code    Activity: activity as tolerated    Diet: diabetic diet      Discharge Medications:     Discharge Medication List as of 12/24/2022  3:11 PM             Details   benzonatate (TESSALON) 100 MG capsule Take 1 capsule by mouth 3 times daily as needed for Cough, Disp-21 capsule, R-0Normal      cefUROXime (CEFTIN) 500 MG tablet Take 1 tablet by mouth 2 times daily for 7 days, Disp-14 tablet, R-0Normal                Details   citalopram (CELEXA) 40 MG tablet Take 40 mg by mouth dailyHistorical Med      Insulin Degludec (TRESIBA FLEXTOUCH) 100 UNIT/ML SOPN Take 12 units dailyHistorical Med      insulin lispro, 1 Unit Dial, (HUMALOG/ADMELOG) 100 UNIT/ML SOPN 4 units with breakfast, lunch and dinner plus scale for maXIMUM OF 30Historical Med      lisinopril (PRINIVIL;ZESTRIL) 5 MG tablet Take 5 mg by mouth dailyHistorical Med      lovastatin (MEVACOR) 20 MG tablet Take 20 mg by mouth nightlyHistorical Med      metFORMIN (GLUCOPHAGE) 1000 MG tablet Take 1,000 mg by mouth 2 times daily (with meals)Historical Med      Multiple Vitamins-Minerals (THERAPEUTIC MULTIVITAMIN-MINERALS) tablet Take 1 tablet by mouth dailyHistorical Med      traZODone (DESYREL) 100 MG tablet Take 100 mg by mouth nightlyHistorical Med             Time Spent on discharge: 45 minutes in the examination, evaluation, counseling and review of medications and discharge plan. Signed:    Divya Sosa MD   12/26/2022      Thank you Reynold Mccullough for the opportunity to be involved in this patient's care. If you have any questions or concerns, please feel free to contact me at [phone].

## 2022-12-27 ENCOUNTER — TELEPHONE (OUTPATIENT)
Dept: PULMONOLOGY | Age: 57
End: 2022-12-27

## 2022-12-27 DIAGNOSIS — R91.8 PULMONARY INFILTRATES: Primary | ICD-10-CM

## 2022-12-27 NOTE — TELEPHONE ENCOUNTER
----- Message from Kaelyn Steiner MD sent at 12/24/2022 11:21 AM EST -----    CT chest without contrast in 6 weeks time for pulm infiltrates and adenopathy and follow-up after that

## 2022-12-29 ENCOUNTER — TELEPHONE (OUTPATIENT)
Dept: PULMONOLOGY | Age: 57
End: 2022-12-29

## 2022-12-29 NOTE — TELEPHONE ENCOUNTER
----- Message from Eulalio Rogers MD sent at 12/24/2022 11:21 AM EST -----    CT chest without contrast in 6 weeks time for pulm infiltrates and adenopathy and follow-up after that

## 2023-01-02 LAB
FUNGUS (MYCOLOGY) CULTURE: NORMAL
FUNGUS STAIN: NORMAL

## 2023-01-03 LAB
AFB CULTURE (MYCOBACTERIA): NORMAL
AFB SMEAR: NORMAL

## 2023-01-08 NOTE — PROGRESS NOTES
Physician Progress Note      Laura Sloan  CSN #:                  [id number]  :                       1965  ADMIT DATE:       2022 12:58 PM  [address] DATE:        2022 4:03 PM  RESPONDING  PROVIDER #:        Lennox Diego MD          QUERY TEXT:    Pt admitted with Pneumonia and DKA. Noted documentation of sepsis on ED   consult note. If possible, please document in progress notes and discharge   summary:      The medical record reflects the following:  Risk Factors: DKA, Pneumonia, HTN, depression  Clinical Indicators: Per ED consult note \" We will treat with broad-spectrum   antibiotics for sepsis\". wbc 20.9, bands 18%, procal 0.68, pulse 112, sodium   118, RR 35  Treatment: IV fluid bolus, IV Rocephin and Zithromax, blood cultures,   pulmonology consult, serial labs, supportive care.     Thank you,  Jostin Hernandez RN BSN  Options provided:  -- Sepsis confirmed present on arrival  -- Sepsis ruled out  -- Other - I will add my own diagnosis  -- Disagree - Not applicable / Not valid  -- Disagree - Clinically unable to determine / Unknown  -- Refer to Clinical Documentation Reviewer    PROVIDER RESPONSE TEXT:    The diagnosis of sepsis was confirmed as present on arrival.    Query created by: Bry Gonzalez on 2023 8:18 AM      Electronically signed by:  Lennox Diego MD 2023 11:37 AM

## 2023-01-09 LAB
FUNGUS (MYCOLOGY) CULTURE: NORMAL
FUNGUS STAIN: NORMAL

## 2023-01-10 LAB
AFB CULTURE (MYCOBACTERIA): NORMAL
AFB SMEAR: NORMAL

## 2023-02-09 ENCOUNTER — TELEPHONE (OUTPATIENT)
Dept: PULMONOLOGY | Age: 58
End: 2023-02-09

## 2023-02-09 NOTE — TELEPHONE ENCOUNTER
Patient did not show for [facility] fu  with Dr. Esau Adams on 2/9/23. Reason:  na    This is patient's first no show. Patient was ano show on: na.      Patient did not reschedule.   Reschedule date:  na      2.9.23-LVM tcb to [facility]

## 2023-02-14 ENCOUNTER — TELEPHONE (OUTPATIENT)
Dept: PULMONOLOGY | Age: 58
End: 2023-02-14

## 2023-02-14 NOTE — TELEPHONE ENCOUNTER
Received a approval from [facility] pt Ins for patient CT. Schedule CT same  day f/u w/Dr Damien Chandra  On 3/30/23 at 8:30 am.  Call pt and left VM  making him aware.

## 2023-03-28 ENCOUNTER — TELEPHONE (OUTPATIENT)
Dept: PULMONOLOGY | Age: 58
End: 2023-03-28

## 2024-09-16 NOTE — TELEPHONE ENCOUNTER
Patient calling stating he has a 3.30.23 for results of test.  States Dr. Melecio Jaeger called him and gave him the results so he believes he does not need this appt. Please call patient and advise. If he does not need it, please cancel the 3.30.23 appt. With Dr. Melecio Jaeger.
Patient was contacted and relate Dr Fabby Mckeon message, pt need to be seen PRN . pt verbalize understanding.
The CT he is talking about was from 12/20 [facility] and he was to have repeat ct which he kept canceling and RS he does need this to fu on Pulm. Infiltrates. Along with FU with Dr. Madeline Cramer. LM for pt. To call. CT needs to be RS.
Upon futher investigation pt had CT at [facility] which in Feb which he states Dr. Esau Adams told him it was ok. He canceled or no showed for his fu appointment. Now he has cancelled his CT for March 30th as well as appointment for March 30th as he feels this is no longer required Please advise.
No

## (undated) DEVICE — TUBING, SUCTION, 3/16" X 12', STRAIGHT: Brand: MEDLINE

## (undated) DEVICE — BRONCHOSCOPE CYTOLOGY BRUSH: Brand: COOK

## (undated) DEVICE — SINGLE USE SUCTION VALVE MAJ-209: Brand: SINGLE USE SUCTION VALVE (STERILE)

## (undated) DEVICE — NEEDLE ASPIR 21GA L700MM US GUID TREAT DST END FOR EFFICIENT

## (undated) DEVICE — ELECTRODE,RADIOTRANSLUCENT,FOAM,3PK: Brand: MEDLINE

## (undated) DEVICE — SINGLE USE BIOPSY VALVE MAJ-210: Brand: SINGLE USE BIOPSY VALVE (STERILE)

## (undated) DEVICE — TUBING, SUCTION, 3/16" X 6', STRAIGHT: Brand: MEDLINE

## (undated) DEVICE — CANNULA,OXY,ADULT,SUPERSOFT,W/7'TUB,SC: Brand: MEDLINE INDUSTRIES, INC.

## (undated) DEVICE — LINER,SOFT,SUCTION CANISTER,1500CC: Brand: MEDLINE

## (undated) DEVICE — YANKAUER,BULB TIP,W/O VENT,RIGID,STERILE: Brand: MEDLINE

## (undated) DEVICE — CONMED SCOPE SAVER BITE BLOCK, 20X27 MM: Brand: SCOPE SAVER

## (undated) DEVICE — SYRINGE MED 10ML SLIP TIP BLNT FILL AND LUERLOCK DISP

## (undated) DEVICE — TRAP,MUCUS SPECIMEN, 80CC: Brand: MEDLINE

## (undated) DEVICE — SYRINGE MED 50ML LUERSLIP TIP

## (undated) DEVICE — BOWL MED M 16OZ PLAS CAP GRAD